# Patient Record
Sex: FEMALE | Race: BLACK OR AFRICAN AMERICAN | NOT HISPANIC OR LATINO | URBAN - METROPOLITAN AREA
[De-identification: names, ages, dates, MRNs, and addresses within clinical notes are randomized per-mention and may not be internally consistent; named-entity substitution may affect disease eponyms.]

---

## 2018-08-14 ENCOUNTER — EMERGENCY (EMERGENCY)
Facility: HOSPITAL | Age: 28
LOS: 1 days | Discharge: HOME | End: 2018-08-14
Attending: EMERGENCY MEDICINE
Payer: COMMERCIAL

## 2018-08-14 VITALS
SYSTOLIC BLOOD PRESSURE: 124 MMHG | HEART RATE: 73 BPM | TEMPERATURE: 99 F | RESPIRATION RATE: 20 BRPM | OXYGEN SATURATION: 97 % | DIASTOLIC BLOOD PRESSURE: 77 MMHG

## 2018-08-14 VITALS — WEIGHT: 111.99 LBS | HEIGHT: 64 IN

## 2018-08-14 LAB
APPEARANCE UR: CLEAR — SIGNIFICANT CHANGE UP
BACTERIA # UR AUTO: ABNORMAL
BILIRUB UR-MCNC: NEGATIVE — SIGNIFICANT CHANGE UP
COLOR SPEC: YELLOW — SIGNIFICANT CHANGE UP
DIFF PNL FLD: ABNORMAL
EPI CELLS # UR: SIGNIFICANT CHANGE UP
GLUCOSE UR QL: NEGATIVE MG/DL — SIGNIFICANT CHANGE UP
HCG UR QL: NEGATIVE — SIGNIFICANT CHANGE UP
KETONES UR-MCNC: ABNORMAL
LEUKOCYTE ESTERASE UR-ACNC: ABNORMAL
NITRITE UR-MCNC: NEGATIVE — SIGNIFICANT CHANGE UP
PH UR: 6 — SIGNIFICANT CHANGE UP (ref 5–8)
PROT UR-MCNC: 30 MG/DL
RBC CASTS # UR COMP ASSIST: ABNORMAL /HPF (ref 0–4)
SP GR SPEC: 1.01 — SIGNIFICANT CHANGE UP (ref 1.01–1.02)
UROBILINOGEN FLD QL: 4 MG/DL
WBC UR QL: SIGNIFICANT CHANGE UP

## 2018-08-14 PROCEDURE — 81001 URINALYSIS AUTO W/SCOPE: CPT

## 2018-08-14 PROCEDURE — 93010 ELECTROCARDIOGRAM REPORT: CPT

## 2018-08-14 PROCEDURE — 71046 X-RAY EXAM CHEST 2 VIEWS: CPT | Mod: 26

## 2018-08-14 PROCEDURE — 99283 EMERGENCY DEPT VISIT LOW MDM: CPT | Mod: 25

## 2018-08-14 PROCEDURE — 99284 EMERGENCY DEPT VISIT MOD MDM: CPT

## 2018-08-14 PROCEDURE — 71046 X-RAY EXAM CHEST 2 VIEWS: CPT

## 2018-08-14 PROCEDURE — 81025 URINE PREGNANCY TEST: CPT

## 2018-08-14 PROCEDURE — 93005 ELECTROCARDIOGRAM TRACING: CPT

## 2018-08-14 PROCEDURE — 96372 THER/PROPH/DIAG INJ SC/IM: CPT

## 2018-08-14 RX ORDER — ACETAMINOPHEN 500 MG
2 TABLET ORAL
Qty: 60 | Refills: 0
Start: 2018-08-14 | End: 2018-08-18

## 2018-08-14 RX ORDER — KETOROLAC TROMETHAMINE 30 MG/ML
60 SYRINGE (ML) INJECTION ONCE
Qty: 0 | Refills: 0 | Status: DISCONTINUED | OUTPATIENT
Start: 2018-08-14 | End: 2018-08-14

## 2018-08-14 RX ORDER — METOCLOPRAMIDE HCL 10 MG
1 TABLET ORAL
Qty: 20 | Refills: 0
Start: 2018-08-14 | End: 2018-08-18

## 2018-08-14 RX ORDER — ACETAMINOPHEN 500 MG
975 TABLET ORAL ONCE
Qty: 0 | Refills: 0 | Status: COMPLETED | OUTPATIENT
Start: 2018-08-14 | End: 2018-08-14

## 2018-08-14 RX ORDER — METOCLOPRAMIDE HCL 10 MG
10 TABLET ORAL ONCE
Qty: 0 | Refills: 0 | Status: COMPLETED | OUTPATIENT
Start: 2018-08-14 | End: 2018-08-14

## 2018-08-14 RX ORDER — IBUPROFEN 200 MG
1 TABLET ORAL
Qty: 20 | Refills: 0
Start: 2018-08-14 | End: 2018-08-18

## 2018-08-14 RX ADMIN — Medication 975 MILLIGRAM(S): at 09:27

## 2018-08-14 RX ADMIN — Medication 60 MILLIGRAM(S): at 10:49

## 2018-08-14 RX ADMIN — Medication 10 MILLIGRAM(S): at 10:49

## 2018-08-14 NOTE — ED STATDOCS - MEDICAL DECISION MAKING DETAILS
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
## 2018-08-14 NOTE — ED STATDOCS - CARE PLAN
Principal Discharge DX:	Headache  Secondary Diagnosis:	Chest wall pain  Secondary Diagnosis:	Bradycardia

## 2018-08-14 NOTE — ED ADULT TRIAGE NOTE - CHIEF COMPLAINT QUOTE
'I am have chest soreness and a really bad headache that started at 6am. I also feel dizzy and like I am going to fall over. '

## 2018-08-14 NOTE — ED STATDOCS - OBJECTIVE STATEMENT
This is a 28 year old with no pmhx and shx 3 medical abortions c/o chest pain, pressure like 8/10, feels sore that began yesterday.  She notes chest pain progressively getting worse.   She also c/o headache 10/10 along frontal aspect, that started 3 hours ago.  She denies taking any pain medication.  She reports nausea and lightheadness.  She denies any cough, runny nose, URI symptoms, fevers, chills, recent travel or rashes.  LMP 8/13.  She follows up with PCP, doesn't know their name.

## 2018-08-14 NOTE — ED STATDOCS - ATTENDING CONTRIBUTION TO CARE
28 year old Male seen and evaluated with ACP  Presents to Ed for chest pain, localized, increase with palpation/mvment  denies fever, chills, sob, leg swelling, calf pain  vitals reviewed, exam as documented  ecg, pain meds, reassess

## 2019-06-05 ENCOUNTER — APPOINTMENT (OUTPATIENT)
Dept: OBGYN | Facility: CLINIC | Age: 29
End: 2019-06-05
Payer: COMMERCIAL

## 2019-06-05 VITALS — HEIGHT: 64 IN | WEIGHT: 121 LBS | BODY MASS INDEX: 20.66 KG/M2

## 2019-06-05 VITALS — DIASTOLIC BLOOD PRESSURE: 70 MMHG | SYSTOLIC BLOOD PRESSURE: 118 MMHG

## 2019-06-05 LAB
BILIRUB UR QL STRIP: NORMAL
CLARITY UR: CLEAR
COLLECTION METHOD: NORMAL
GLUCOSE UR-MCNC: NORMAL
HCG UR QL: 0.2 EU/DL
HCG UR QL: POSITIVE
HGB UR QL STRIP.AUTO: NORMAL
KETONES UR-MCNC: NORMAL
LEUKOCYTE ESTERASE UR QL STRIP: NORMAL
NITRITE UR QL STRIP: NORMAL
PH UR STRIP: 5.5
PROT UR STRIP-MCNC: NORMAL
QUALITY CONTROL: YES
SP GR UR STRIP: 1.02

## 2019-06-05 PROCEDURE — 81025 URINE PREGNANCY TEST: CPT

## 2019-06-05 PROCEDURE — 81003 URINALYSIS AUTO W/O SCOPE: CPT | Mod: QW

## 2019-06-05 PROCEDURE — 99204 OFFICE O/P NEW MOD 45 MIN: CPT

## 2019-06-05 NOTE — HISTORY OF PRESENT ILLNESS
[Last Pap ___] : Last cervical pap smear was [unfilled] [Definite:  ___ (Date)] : the last menstrual period was [unfilled] [Sexually Active] : is sexually active [Male ___] : [unfilled] male

## 2019-06-05 NOTE — COUNSELING
[FreeTextEntry2] : we reviewed dietary and medication restrictions, H20 consumption advised, call parameters reviewed. Pt advised to return to office for new OB in one week, sooner prn

## 2019-06-07 ENCOUNTER — RESULT REVIEW (OUTPATIENT)
Age: 29
End: 2019-06-07

## 2019-06-07 DIAGNOSIS — A54.9 GONOCOCCAL INFECTION, UNSPECIFIED: ICD-10-CM

## 2019-06-08 ENCOUNTER — APPOINTMENT (OUTPATIENT)
Dept: OBGYN | Facility: CLINIC | Age: 29
End: 2019-06-08
Payer: COMMERCIAL

## 2019-06-08 VITALS
HEIGHT: 64 IN | WEIGHT: 121 LBS | BODY MASS INDEX: 20.66 KG/M2 | DIASTOLIC BLOOD PRESSURE: 62 MMHG | SYSTOLIC BLOOD PRESSURE: 112 MMHG

## 2019-06-08 LAB
C TRACH RRNA SPEC QL NAA+PROBE: NORMAL
N GONORRHOEA RRNA SPEC QL NAA+PROBE: ABNORMAL
SOURCE AMPLIFICATION: NORMAL

## 2019-06-08 PROCEDURE — 99213 OFFICE O/P EST LOW 20 MIN: CPT

## 2019-06-08 NOTE — HISTORY OF PRESENT ILLNESS
[Last Screen ___] : last STD screen was [unfilled] [Sexually Active] : is sexually active [Definite:  ___ (Date)] : the last menstrual period was [unfilled]
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
## 2019-06-08 NOTE — PHYSICAL EXAM
[Labia Majora] : labia major [Labia Minora] : labia minora [Normal] : clitoris [Moderate] : moderate [Discharge] : a  ~M vaginal discharge was present [White] : white [Thin] : thin

## 2019-06-10 ENCOUNTER — RESULT REVIEW (OUTPATIENT)
Age: 29
End: 2019-06-10

## 2019-06-10 DIAGNOSIS — A59.9 TRICHOMONIASIS, UNSPECIFIED: ICD-10-CM

## 2019-06-10 LAB
C TRACH RRNA SPEC QL NAA+PROBE: NOT DETECTED
CANDIDA VAG CYTO: NOT DETECTED
G VAGINALIS+PREV SP MTYP VAG QL MICRO: DETECTED
N GONORRHOEA RRNA SPEC QL NAA+PROBE: NOT DETECTED
SOURCE AMPLIFICATION: NORMAL
T VAGINALIS VAG QL WET PREP: DETECTED

## 2019-06-13 ENCOUNTER — APPOINTMENT (OUTPATIENT)
Dept: OBGYN | Facility: CLINIC | Age: 29
End: 2019-06-13
Payer: COMMERCIAL

## 2019-06-13 ENCOUNTER — APPOINTMENT (OUTPATIENT)
Dept: OBGYN | Facility: CLINIC | Age: 29
End: 2019-06-13

## 2019-06-13 ENCOUNTER — ASOB RESULT (OUTPATIENT)
Age: 29
End: 2019-06-13

## 2019-06-13 ENCOUNTER — NON-APPOINTMENT (OUTPATIENT)
Age: 29
End: 2019-06-13

## 2019-06-13 VITALS
BODY MASS INDEX: 20.83 KG/M2 | WEIGHT: 122 LBS | HEIGHT: 64 IN | SYSTOLIC BLOOD PRESSURE: 106 MMHG | DIASTOLIC BLOOD PRESSURE: 70 MMHG

## 2019-06-13 LAB
BILIRUB UR QL STRIP: ABNORMAL
GLUCOSE UR-MCNC: NORMAL
HCG UR QL: 0.2 EU/DL
HGB UR QL STRIP.AUTO: ABNORMAL
KETONES UR-MCNC: ABNORMAL
LEUKOCYTE ESTERASE UR QL STRIP: NORMAL
NITRITE UR QL STRIP: NORMAL
PH UR STRIP: 6
PROT UR STRIP-MCNC: ABNORMAL
SP GR UR STRIP: 1.02

## 2019-06-13 PROCEDURE — 76817 TRANSVAGINAL US OBSTETRIC: CPT

## 2019-06-13 PROCEDURE — 36415 COLL VENOUS BLD VENIPUNCTURE: CPT

## 2019-06-13 PROCEDURE — 99214 OFFICE O/P EST MOD 30 MIN: CPT | Mod: TH

## 2019-06-18 LAB
ABO + RH PNL BLD: NORMAL
BASOPHILS # BLD AUTO: 0.03 K/UL
BASOPHILS NFR BLD AUTO: 0.5 %
BLD GP AB SCN SERPL QL: NORMAL
CMV IGG SERPL QL: 2.8 U/ML
CMV IGG SERPL-IMP: POSITIVE
CMV IGM SERPL QL: <8 AU/ML
CMV IGM SERPL QL: NEGATIVE
EOSINOPHIL # BLD AUTO: 0.04 K/UL
EOSINOPHIL NFR BLD AUTO: 0.6 %
ESTIMATED AVERAGE GLUCOSE: 97 MG/DL
FMR1 GENE MUT ANL BLD/T: NORMAL
HBA1C MFR BLD HPLC: 5 %
HBV SURFACE AG SER QL: NONREACTIVE
HCT VFR BLD CALC: 40.7 %
HGB A MFR BLD: 97.6 %
HGB A2 MFR BLD: 2.4 %
HGB BLD-MCNC: 13.6 G/DL
HGB FRACT BLD-IMP: NORMAL
HIV1+2 AB SPEC QL IA.RAPID: NONREACTIVE
IMM GRANULOCYTES NFR BLD AUTO: 0.3 %
LYMPHOCYTES # BLD AUTO: 1.93 K/UL
LYMPHOCYTES NFR BLD AUTO: 29.6 %
MAN DIFF?: NORMAL
MCHC RBC-ENTMCNC: 29.1 PG
MCHC RBC-ENTMCNC: 33.4 GM/DL
MCV RBC AUTO: 87 FL
MEV IGG FLD QL IA: >300 AU/ML
MEV IGG+IGM SER-IMP: POSITIVE
MONOCYTES # BLD AUTO: 0.48 K/UL
MONOCYTES NFR BLD AUTO: 7.4 %
NEUTROPHILS # BLD AUTO: 4.03 K/UL
NEUTROPHILS NFR BLD AUTO: 61.6 %
PLATELET # BLD AUTO: 259 K/UL
RBC # BLD: 4.68 M/UL
RBC # FLD: 12.8 %
RUBV IGG FLD-ACNC: 1.3 INDEX
RUBV IGG SER-IMP: POSITIVE
T GONDII AB SER-IMP: NEGATIVE
T GONDII AB SER-IMP: NEGATIVE
T GONDII IGG SER QL: <3 IU/ML
T GONDII IGM SER QL: <3 AU/ML
T PALLIDUM AB SER QL IA: NEGATIVE
TSH SERPL-ACNC: 3.95 UIU/ML
WBC # FLD AUTO: 6.53 K/UL

## 2019-06-20 LAB
B19V IGG SER QL IA: 0.2 INDEX
B19V IGG+IGM SER-IMP: NEGATIVE
B19V IGG+IGM SER-IMP: NORMAL
B19V IGM FLD-ACNC: 0.2 INDEX
B19V IGM SER-ACNC: NEGATIVE

## 2019-06-21 ENCOUNTER — APPOINTMENT (OUTPATIENT)
Dept: OBGYN | Facility: CLINIC | Age: 29
End: 2019-06-21

## 2019-06-24 LAB
AR GENE MUT ANL BLD/T: NEGATIVE
CFTR MUT TESTED BLD/T: NEGATIVE

## 2019-07-10 ENCOUNTER — APPOINTMENT (OUTPATIENT)
Dept: OBGYN | Facility: CLINIC | Age: 29
End: 2019-07-10

## 2019-07-11 ENCOUNTER — APPOINTMENT (OUTPATIENT)
Dept: OBGYN | Facility: CLINIC | Age: 29
End: 2019-07-11
Payer: COMMERCIAL

## 2019-07-11 ENCOUNTER — NON-APPOINTMENT (OUTPATIENT)
Age: 29
End: 2019-07-11

## 2019-07-11 ENCOUNTER — ASOB RESULT (OUTPATIENT)
Age: 29
End: 2019-07-11

## 2019-07-11 VITALS — SYSTOLIC BLOOD PRESSURE: 110 MMHG | BODY MASS INDEX: 20.6 KG/M2 | WEIGHT: 120 LBS | DIASTOLIC BLOOD PRESSURE: 68 MMHG

## 2019-07-11 PROCEDURE — 76813 OB US NUCHAL MEAS 1 GEST: CPT

## 2019-07-11 PROCEDURE — 99213 OFFICE O/P EST LOW 20 MIN: CPT | Mod: TH

## 2019-07-11 PROCEDURE — 36415 COLL VENOUS BLD VENIPUNCTURE: CPT

## 2019-07-16 ENCOUNTER — LABORATORY RESULT (OUTPATIENT)
Age: 29
End: 2019-07-16

## 2019-07-17 ENCOUNTER — APPOINTMENT (OUTPATIENT)
Dept: OBGYN | Facility: CLINIC | Age: 29
End: 2019-07-17
Payer: COMMERCIAL

## 2019-07-17 PROCEDURE — 36415 COLL VENOUS BLD VENIPUNCTURE: CPT

## 2019-08-08 ENCOUNTER — APPOINTMENT (OUTPATIENT)
Dept: OBGYN | Facility: CLINIC | Age: 29
End: 2019-08-08
Payer: COMMERCIAL

## 2019-08-08 VITALS
BODY MASS INDEX: 21 KG/M2 | WEIGHT: 123 LBS | DIASTOLIC BLOOD PRESSURE: 60 MMHG | SYSTOLIC BLOOD PRESSURE: 92 MMHG | HEIGHT: 64 IN

## 2019-08-08 LAB
BILIRUB UR QL STRIP: ABNORMAL
CLARITY UR: CLEAR
COLLECTION METHOD: NORMAL
GLUCOSE UR-MCNC: NORMAL
HCG UR QL: 1 EU/DL
HGB UR QL STRIP.AUTO: NORMAL
KETONES UR-MCNC: ABNORMAL
LEUKOCYTE ESTERASE UR QL STRIP: NORMAL
NITRITE UR QL STRIP: NORMAL
PH UR STRIP: 6
PROT UR STRIP-MCNC: NORMAL
SOURCE AMPLIFICATION: NORMAL
SP GR UR STRIP: 1.02
T VAGINALIS RRNA SPEC QL NAA+PROBE: NOT DETECTED

## 2019-08-08 PROCEDURE — 99214 OFFICE O/P EST MOD 30 MIN: CPT | Mod: TH

## 2019-08-08 PROCEDURE — 36415 COLL VENOUS BLD VENIPUNCTURE: CPT

## 2019-08-27 ENCOUNTER — APPOINTMENT (OUTPATIENT)
Dept: ANTEPARTUM | Facility: CLINIC | Age: 29
End: 2019-08-27
Payer: COMMERCIAL

## 2019-08-27 ENCOUNTER — ASOB RESULT (OUTPATIENT)
Age: 29
End: 2019-08-27

## 2019-08-27 PROCEDURE — 76805 OB US >/= 14 WKS SNGL FETUS: CPT

## 2019-08-27 PROCEDURE — 99203 OFFICE O/P NEW LOW 30 MIN: CPT | Mod: 25

## 2019-08-27 PROCEDURE — 76817 TRANSVAGINAL US OBSTETRIC: CPT

## 2019-09-03 ENCOUNTER — APPOINTMENT (OUTPATIENT)
Dept: ANTEPARTUM | Facility: CLINIC | Age: 29
End: 2019-09-03
Payer: COMMERCIAL

## 2019-09-03 ENCOUNTER — ASOB RESULT (OUTPATIENT)
Age: 29
End: 2019-09-03

## 2019-09-03 PROCEDURE — 76817 TRANSVAGINAL US OBSTETRIC: CPT

## 2019-09-03 PROCEDURE — 76815 OB US LIMITED FETUS(S): CPT

## 2019-09-04 ENCOUNTER — NON-APPOINTMENT (OUTPATIENT)
Age: 29
End: 2019-09-04

## 2019-09-04 LAB
1ST TRIMESTER DATA: NORMAL
2ND TRIMESTER DATA: NORMAL
AFP PNL SERPL: NORMAL
AFP SERPL-ACNC: NORMAL
AFP SERPL-ACNC: NORMAL
B-HCG FREE SERPL-MCNC: NORMAL
CLINICAL BIOCHEMIST REVIEW: NORMAL
FREE BETA HCG 1ST TRIMESTER: NORMAL
INHIBIN A SERPL-MCNC: NORMAL
NASAL BONE: PRESENT
NOTES NTD: NORMAL
NT: NORMAL
PAPP-A SERPL-ACNC: NORMAL
U ESTRIOL SERPL-SCNC: NORMAL

## 2019-09-05 ENCOUNTER — NON-APPOINTMENT (OUTPATIENT)
Age: 29
End: 2019-09-05

## 2019-09-05 ENCOUNTER — APPOINTMENT (OUTPATIENT)
Dept: OBGYN | Facility: CLINIC | Age: 29
End: 2019-09-05
Payer: COMMERCIAL

## 2019-09-05 VITALS
WEIGHT: 127 LBS | BODY MASS INDEX: 21.68 KG/M2 | SYSTOLIC BLOOD PRESSURE: 110 MMHG | DIASTOLIC BLOOD PRESSURE: 70 MMHG | HEIGHT: 64 IN

## 2019-09-05 LAB
BILIRUB UR QL STRIP: NORMAL
GLUCOSE UR-MCNC: NORMAL
HCG UR QL: 2 EU/DL
HGB UR QL STRIP.AUTO: NORMAL
KETONES UR-MCNC: ABNORMAL
LEUKOCYTE ESTERASE UR QL STRIP: NORMAL
NITRITE UR QL STRIP: NORMAL
PH UR STRIP: 6
PROT UR STRIP-MCNC: NORMAL
SP GR UR STRIP: 1.02

## 2019-09-05 PROCEDURE — 99213 OFFICE O/P EST LOW 20 MIN: CPT | Mod: TH

## 2019-09-18 ENCOUNTER — APPOINTMENT (OUTPATIENT)
Dept: ANTEPARTUM | Facility: CLINIC | Age: 29
End: 2019-09-18
Payer: COMMERCIAL

## 2019-09-18 ENCOUNTER — ASOB RESULT (OUTPATIENT)
Age: 29
End: 2019-09-18

## 2019-09-18 PROCEDURE — 76815 OB US LIMITED FETUS(S): CPT

## 2019-09-18 PROCEDURE — 76817 TRANSVAGINAL US OBSTETRIC: CPT

## 2019-09-23 ENCOUNTER — APPOINTMENT (OUTPATIENT)
Dept: OBGYN | Facility: CLINIC | Age: 29
End: 2019-09-23
Payer: COMMERCIAL

## 2019-09-23 ENCOUNTER — NON-APPOINTMENT (OUTPATIENT)
Age: 29
End: 2019-09-23

## 2019-09-23 VITALS
WEIGHT: 124.38 LBS | DIASTOLIC BLOOD PRESSURE: 70 MMHG | SYSTOLIC BLOOD PRESSURE: 120 MMHG | BODY MASS INDEX: 21.35 KG/M2

## 2019-09-23 PROCEDURE — 81003 URINALYSIS AUTO W/O SCOPE: CPT | Mod: QW

## 2019-09-23 PROCEDURE — 99214 OFFICE O/P EST MOD 30 MIN: CPT | Mod: TH

## 2019-09-24 LAB
APPEARANCE: CLEAR
BACTERIA: ABNORMAL
BILIRUBIN URINE: NEGATIVE
BLOOD URINE: NEGATIVE
COLOR: YELLOW
GLUCOSE QUALITATIVE U: NEGATIVE
HYALINE CASTS: 2 /LPF
KETONES URINE: NEGATIVE
LEUKOCYTE ESTERASE URINE: NEGATIVE
MICROSCOPIC-UA: NORMAL
NITRITE URINE: NEGATIVE
PH URINE: 6.5
PROTEIN URINE: NORMAL
RED BLOOD CELLS URINE: 1 /HPF
SPECIFIC GRAVITY URINE: 1.04
SQUAMOUS EPITHELIAL CELLS: 12 /HPF
UROBILINOGEN URINE: ABNORMAL
WHITE BLOOD CELLS URINE: 2 /HPF

## 2019-09-25 ENCOUNTER — ASOB RESULT (OUTPATIENT)
Age: 29
End: 2019-09-25

## 2019-09-25 ENCOUNTER — APPOINTMENT (OUTPATIENT)
Dept: ANTEPARTUM | Facility: CLINIC | Age: 29
End: 2019-09-25
Payer: COMMERCIAL

## 2019-09-25 ENCOUNTER — APPOINTMENT (OUTPATIENT)
Dept: MATERNAL FETAL MEDICINE | Facility: CLINIC | Age: 29
End: 2019-09-25
Payer: COMMERCIAL

## 2019-09-25 PROCEDURE — 76817 TRANSVAGINAL US OBSTETRIC: CPT

## 2019-09-25 PROCEDURE — 76815 OB US LIMITED FETUS(S): CPT | Mod: 59

## 2019-09-25 PROCEDURE — 93325 DOPPLER ECHO COLOR FLOW MAPG: CPT

## 2019-09-25 PROCEDURE — 76825 ECHO EXAM OF FETAL HEART: CPT

## 2019-09-25 PROCEDURE — 99214 OFFICE O/P EST MOD 30 MIN: CPT

## 2019-10-01 LAB
BILIRUB UR QL STRIP: ABNORMAL
GLUCOSE UR-MCNC: NORMAL
HCG UR QL: 1 EU/DL
HGB UR QL STRIP.AUTO: NORMAL
KETONES UR-MCNC: NORMAL
LEUKOCYTE ESTERASE UR QL STRIP: NORMAL
NITRITE UR QL STRIP: NORMAL
PH UR STRIP: 6
PROT UR STRIP-MCNC: NORMAL
SP GR UR STRIP: 1.02

## 2019-10-02 ENCOUNTER — ASOB RESULT (OUTPATIENT)
Age: 29
End: 2019-10-02

## 2019-10-02 ENCOUNTER — APPOINTMENT (OUTPATIENT)
Dept: ANTEPARTUM | Facility: CLINIC | Age: 29
End: 2019-10-02
Payer: COMMERCIAL

## 2019-10-02 PROCEDURE — 76815 OB US LIMITED FETUS(S): CPT

## 2019-10-02 PROCEDURE — 76817 TRANSVAGINAL US OBSTETRIC: CPT

## 2019-10-07 LAB
BACTERIA UR CULT: NORMAL
CANDIDA VAG CYTO: NOT DETECTED
G VAGINALIS+PREV SP MTYP VAG QL MICRO: DETECTED
T VAGINALIS VAG QL WET PREP: NOT DETECTED

## 2019-10-09 ENCOUNTER — ASOB RESULT (OUTPATIENT)
Age: 29
End: 2019-10-09

## 2019-10-09 ENCOUNTER — APPOINTMENT (OUTPATIENT)
Dept: ANTEPARTUM | Facility: CLINIC | Age: 29
End: 2019-10-09
Payer: COMMERCIAL

## 2019-10-09 PROCEDURE — 76817 TRANSVAGINAL US OBSTETRIC: CPT

## 2019-10-14 ENCOUNTER — MED ADMIN CHARGE (OUTPATIENT)
Age: 29
End: 2019-10-14

## 2019-10-14 ENCOUNTER — APPOINTMENT (OUTPATIENT)
Dept: OBGYN | Facility: CLINIC | Age: 29
End: 2019-10-14
Payer: COMMERCIAL

## 2019-10-14 ENCOUNTER — NON-APPOINTMENT (OUTPATIENT)
Age: 29
End: 2019-10-14

## 2019-10-14 VITALS
DIASTOLIC BLOOD PRESSURE: 68 MMHG | SYSTOLIC BLOOD PRESSURE: 120 MMHG | WEIGHT: 131 LBS | HEIGHT: 64 IN | BODY MASS INDEX: 22.36 KG/M2

## 2019-10-14 DIAGNOSIS — Z67.91 UNSPECIFIED BLOOD TYPE, RH NEGATIVE: ICD-10-CM

## 2019-10-14 LAB
BILIRUB UR QL STRIP: NORMAL
CLARITY UR: CLEAR
COLLECTION METHOD: NORMAL
GLUCOSE UR-MCNC: NORMAL
HCG UR QL: 1 EU/DL
HGB UR QL STRIP.AUTO: NORMAL
KETONES UR-MCNC: NORMAL
LEUKOCYTE ESTERASE UR QL STRIP: NORMAL
NITRITE UR QL STRIP: NORMAL
PH UR STRIP: 6
PROT UR STRIP-MCNC: NORMAL
SP GR UR STRIP: 1.02

## 2019-10-14 PROCEDURE — 99213 OFFICE O/P EST LOW 20 MIN: CPT | Mod: TH

## 2019-10-14 RX ORDER — HUMAN RHO(D) IMMUNE GLOBULIN 300 UG/1
1500 INJECTION, SOLUTION INTRAMUSCULAR
Refills: 0 | Status: COMPLETED | OUTPATIENT
Start: 2019-10-14

## 2019-10-15 LAB
BASOPHILS # BLD AUTO: 0.03 K/UL
BASOPHILS NFR BLD AUTO: 0.4 %
EOSINOPHIL # BLD AUTO: 0.12 K/UL
EOSINOPHIL NFR BLD AUTO: 1.6 %
GLUCOSE 1H P 50 G GLC PO SERPL-MCNC: 89 MG/DL
HCT VFR BLD CALC: 34.6 %
HGB BLD-MCNC: 11.4 G/DL
IMM GRANULOCYTES NFR BLD AUTO: 0.5 %
LYMPHOCYTES # BLD AUTO: 1.93 K/UL
LYMPHOCYTES NFR BLD AUTO: 25.6 %
MAN DIFF?: NORMAL
MCHC RBC-ENTMCNC: 28.7 PG
MCHC RBC-ENTMCNC: 32.9 GM/DL
MCV RBC AUTO: 87.2 FL
MONOCYTES # BLD AUTO: 0.55 K/UL
MONOCYTES NFR BLD AUTO: 7.3 %
NEUTROPHILS # BLD AUTO: 4.87 K/UL
NEUTROPHILS NFR BLD AUTO: 64.6 %
PLATELET # BLD AUTO: 216 K/UL
RBC # BLD: 3.97 M/UL
RBC # FLD: 12.7 %
WBC # FLD AUTO: 7.54 K/UL

## 2019-10-16 ENCOUNTER — APPOINTMENT (OUTPATIENT)
Dept: ANTEPARTUM | Facility: CLINIC | Age: 29
End: 2019-10-16
Payer: COMMERCIAL

## 2019-10-16 ENCOUNTER — ASOB RESULT (OUTPATIENT)
Age: 29
End: 2019-10-16

## 2019-10-16 PROCEDURE — 76817 TRANSVAGINAL US OBSTETRIC: CPT

## 2019-10-28 ENCOUNTER — APPOINTMENT (OUTPATIENT)
Dept: OBGYN | Facility: CLINIC | Age: 29
End: 2019-10-28
Payer: COMMERCIAL

## 2019-10-28 ENCOUNTER — NON-APPOINTMENT (OUTPATIENT)
Age: 29
End: 2019-10-28

## 2019-10-28 VITALS
HEIGHT: 64 IN | DIASTOLIC BLOOD PRESSURE: 66 MMHG | WEIGHT: 134 LBS | BODY MASS INDEX: 22.88 KG/M2 | SYSTOLIC BLOOD PRESSURE: 114 MMHG

## 2019-10-28 PROCEDURE — 99213 OFFICE O/P EST LOW 20 MIN: CPT | Mod: TH

## 2019-11-06 ENCOUNTER — APPOINTMENT (OUTPATIENT)
Dept: ANTEPARTUM | Facility: CLINIC | Age: 29
End: 2019-11-06
Payer: COMMERCIAL

## 2019-11-06 ENCOUNTER — ASOB RESULT (OUTPATIENT)
Age: 29
End: 2019-11-06

## 2019-11-06 PROCEDURE — 76817 TRANSVAGINAL US OBSTETRIC: CPT

## 2019-11-12 ENCOUNTER — APPOINTMENT (OUTPATIENT)
Dept: OBGYN | Facility: CLINIC | Age: 29
End: 2019-11-12

## 2019-11-12 VITALS
DIASTOLIC BLOOD PRESSURE: 68 MMHG | WEIGHT: 137 LBS | HEIGHT: 64 IN | BODY MASS INDEX: 23.39 KG/M2 | SYSTOLIC BLOOD PRESSURE: 110 MMHG

## 2019-11-12 LAB
BILIRUB UR QL STRIP: NORMAL
COLLECTION METHOD: NORMAL
GLUCOSE UR-MCNC: NORMAL
HCG UR QL: 0.2 EU/DL
HGB UR QL STRIP.AUTO: NORMAL
KETONES UR-MCNC: ABNORMAL
LEUKOCYTE ESTERASE UR QL STRIP: NORMAL
NITRITE UR QL STRIP: NORMAL
PH UR STRIP: 5.5
PROT UR STRIP-MCNC: NORMAL
SP GR UR STRIP: 1.02

## 2019-11-26 ENCOUNTER — APPOINTMENT (OUTPATIENT)
Dept: OBGYN | Facility: CLINIC | Age: 29
End: 2019-11-26
Payer: COMMERCIAL

## 2019-11-26 ENCOUNTER — NON-APPOINTMENT (OUTPATIENT)
Age: 29
End: 2019-11-26

## 2019-11-26 VITALS — WEIGHT: 138 LBS | DIASTOLIC BLOOD PRESSURE: 65 MMHG | SYSTOLIC BLOOD PRESSURE: 109 MMHG | BODY MASS INDEX: 23.69 KG/M2

## 2019-11-26 LAB
BACTERIA UR CULT: NORMAL
BILIRUB UR QL STRIP: NORMAL
GLUCOSE UR-MCNC: NORMAL
HCG UR QL: 4 EU/DL
HGB UR QL STRIP.AUTO: NORMAL
KETONES UR-MCNC: ABNORMAL
LEUKOCYTE ESTERASE UR QL STRIP: ABNORMAL
NITRITE UR QL STRIP: NORMAL
PH UR STRIP: 7
PROT UR STRIP-MCNC: ABNORMAL
SP GR UR STRIP: 1.02

## 2019-11-26 PROCEDURE — 99213 OFFICE O/P EST LOW 20 MIN: CPT | Mod: TH

## 2019-12-04 ENCOUNTER — ASOB RESULT (OUTPATIENT)
Age: 29
End: 2019-12-04

## 2019-12-04 ENCOUNTER — APPOINTMENT (OUTPATIENT)
Dept: ANTEPARTUM | Facility: CLINIC | Age: 29
End: 2019-12-04
Payer: COMMERCIAL

## 2019-12-04 PROCEDURE — 76816 OB US FOLLOW-UP PER FETUS: CPT

## 2019-12-04 PROCEDURE — 76819 FETAL BIOPHYS PROFIL W/O NST: CPT

## 2019-12-13 ENCOUNTER — APPOINTMENT (OUTPATIENT)
Dept: OBGYN | Facility: CLINIC | Age: 29
End: 2019-12-13
Payer: COMMERCIAL

## 2019-12-13 ENCOUNTER — NON-APPOINTMENT (OUTPATIENT)
Age: 29
End: 2019-12-13

## 2019-12-13 VITALS
WEIGHT: 142 LBS | HEIGHT: 64 IN | BODY MASS INDEX: 24.24 KG/M2 | SYSTOLIC BLOOD PRESSURE: 112 MMHG | DIASTOLIC BLOOD PRESSURE: 64 MMHG

## 2019-12-13 PROCEDURE — 36415 COLL VENOUS BLD VENIPUNCTURE: CPT

## 2019-12-13 PROCEDURE — 99213 OFFICE O/P EST LOW 20 MIN: CPT | Mod: TH

## 2019-12-20 ENCOUNTER — APPOINTMENT (OUTPATIENT)
Dept: OBGYN | Facility: CLINIC | Age: 29
End: 2019-12-20

## 2019-12-20 VITALS
BODY MASS INDEX: 24.92 KG/M2 | WEIGHT: 146 LBS | SYSTOLIC BLOOD PRESSURE: 112 MMHG | DIASTOLIC BLOOD PRESSURE: 64 MMHG | HEIGHT: 64 IN

## 2019-12-20 LAB
BILIRUB UR QL STRIP: NORMAL
COLLECTION METHOD: NORMAL
GLUCOSE UR-MCNC: NORMAL
HCG UR QL: 1 EU/DL
HGB UR QL STRIP.AUTO: NORMAL
KETONES UR-MCNC: NORMAL
LEUKOCYTE ESTERASE UR QL STRIP: NORMAL
NITRITE UR QL STRIP: NORMAL
PH UR STRIP: 7
PROT UR STRIP-MCNC: ABNORMAL
SP GR UR STRIP: 1.02

## 2019-12-27 ENCOUNTER — NON-APPOINTMENT (OUTPATIENT)
Age: 29
End: 2019-12-27

## 2019-12-27 ENCOUNTER — APPOINTMENT (OUTPATIENT)
Dept: OBGYN | Facility: CLINIC | Age: 29
End: 2019-12-27
Payer: COMMERCIAL

## 2019-12-27 VITALS
DIASTOLIC BLOOD PRESSURE: 60 MMHG | BODY MASS INDEX: 25.27 KG/M2 | HEIGHT: 64 IN | SYSTOLIC BLOOD PRESSURE: 108 MMHG | WEIGHT: 148 LBS

## 2019-12-27 PROCEDURE — 99213 OFFICE O/P EST LOW 20 MIN: CPT | Mod: TH

## 2020-01-03 ENCOUNTER — APPOINTMENT (OUTPATIENT)
Dept: OBGYN | Facility: CLINIC | Age: 30
End: 2020-01-03

## 2020-01-03 ENCOUNTER — ASOB RESULT (OUTPATIENT)
Age: 30
End: 2020-01-03

## 2020-01-03 ENCOUNTER — RESULT REVIEW (OUTPATIENT)
Age: 30
End: 2020-01-03

## 2020-01-03 ENCOUNTER — INPATIENT (INPATIENT)
Facility: HOSPITAL | Age: 30
LOS: 1 days | Discharge: HOME | DRG: 833 | End: 2020-01-05
Attending: OBSTETRICS & GYNECOLOGY | Admitting: OBSTETRICS & GYNECOLOGY
Payer: COMMERCIAL

## 2020-01-03 ENCOUNTER — APPOINTMENT (OUTPATIENT)
Dept: ANTEPARTUM | Facility: CLINIC | Age: 30
End: 2020-01-03
Payer: COMMERCIAL

## 2020-01-03 VITALS
HEART RATE: 83 BPM | WEIGHT: 145.95 LBS | TEMPERATURE: 99 F | HEIGHT: 64 IN | SYSTOLIC BLOOD PRESSURE: 123 MMHG | DIASTOLIC BLOOD PRESSURE: 80 MMHG | RESPIRATION RATE: 14 BRPM

## 2020-01-03 DIAGNOSIS — O26.893 OTHER SPECIFIED PREGNANCY RELATED CONDITIONS, THIRD TRIMESTER: ICD-10-CM

## 2020-01-03 DIAGNOSIS — Z98.890 OTHER SPECIFIED POSTPROCEDURAL STATES: Chronic | ICD-10-CM

## 2020-01-03 DIAGNOSIS — O47.1 FALSE LABOR AT OR AFTER 37 COMPLETED WEEKS OF GESTATION: ICD-10-CM

## 2020-01-03 LAB
ALBUMIN SERPL ELPH-MCNC: 3.4 G/DL — SIGNIFICANT CHANGE UP (ref 3.3–5.2)
ALP SERPL-CCNC: 176 U/L — HIGH (ref 40–120)
ALT FLD-CCNC: 8 U/L — SIGNIFICANT CHANGE UP
ANION GAP SERPL CALC-SCNC: 14 MMOL/L — SIGNIFICANT CHANGE UP (ref 5–17)
APPEARANCE UR: CLEAR — SIGNIFICANT CHANGE UP
APTT BLD: 26.6 SEC — LOW (ref 27.5–36.3)
AST SERPL-CCNC: 15 U/L — SIGNIFICANT CHANGE UP
BACTERIA # UR AUTO: ABNORMAL
BASOPHILS # BLD AUTO: 0.02 K/UL — SIGNIFICANT CHANGE UP (ref 0–0.2)
BASOPHILS # BLD AUTO: 0.03 K/UL — SIGNIFICANT CHANGE UP (ref 0–0.2)
BASOPHILS NFR BLD AUTO: 0.2 % — SIGNIFICANT CHANGE UP (ref 0–2)
BASOPHILS NFR BLD AUTO: 0.4 % — SIGNIFICANT CHANGE UP (ref 0–2)
BILIRUB SERPL-MCNC: <0.2 MG/DL — LOW (ref 0.4–2)
BILIRUB UR-MCNC: NEGATIVE — SIGNIFICANT CHANGE UP
BLD GP AB SCN SERPL QL: SIGNIFICANT CHANGE UP
BUN SERPL-MCNC: 6 MG/DL — LOW (ref 8–20)
CALCIUM SERPL-MCNC: 9 MG/DL — SIGNIFICANT CHANGE UP (ref 8.6–10.2)
CHLORIDE SERPL-SCNC: 107 MMOL/L — SIGNIFICANT CHANGE UP (ref 98–107)
CO2 SERPL-SCNC: 19 MMOL/L — LOW (ref 22–29)
COLOR SPEC: YELLOW — SIGNIFICANT CHANGE UP
COMMENT - URINE: SIGNIFICANT CHANGE UP
CREAT SERPL-MCNC: 0.39 MG/DL — LOW (ref 0.5–1.3)
DIFF PNL FLD: NEGATIVE — SIGNIFICANT CHANGE UP
EOSINOPHIL # BLD AUTO: 0.02 K/UL — SIGNIFICANT CHANGE UP (ref 0–0.5)
EOSINOPHIL # BLD AUTO: 0.02 K/UL — SIGNIFICANT CHANGE UP (ref 0–0.5)
EOSINOPHIL NFR BLD AUTO: 0.2 % — SIGNIFICANT CHANGE UP (ref 0–6)
EOSINOPHIL NFR BLD AUTO: 0.2 % — SIGNIFICANT CHANGE UP (ref 0–6)
EPI CELLS # UR: SIGNIFICANT CHANGE UP
FIBRINOGEN PPP-MCNC: 463 MG/DL — SIGNIFICANT CHANGE UP (ref 350–510)
GLUCOSE SERPL-MCNC: 79 MG/DL — SIGNIFICANT CHANGE UP (ref 70–115)
GLUCOSE UR QL: NEGATIVE MG/DL — SIGNIFICANT CHANGE UP
HCT VFR BLD CALC: 34.7 % — SIGNIFICANT CHANGE UP (ref 34.5–45)
HCT VFR BLD CALC: 35.8 % — SIGNIFICANT CHANGE UP (ref 34.5–45)
HGB BLD-MCNC: 11.3 G/DL — LOW (ref 11.5–15.5)
HGB BLD-MCNC: 11.5 G/DL — SIGNIFICANT CHANGE UP (ref 11.5–15.5)
IMM GRANULOCYTES NFR BLD AUTO: 0.5 % — SIGNIFICANT CHANGE UP (ref 0–1.5)
IMM GRANULOCYTES NFR BLD AUTO: 0.7 % — SIGNIFICANT CHANGE UP (ref 0–1.5)
INR BLD: 0.86 RATIO — LOW (ref 0.88–1.16)
KETONES UR-MCNC: ABNORMAL
LDH SERPL L TO P-CCNC: 233 U/L — HIGH (ref 98–192)
LEUKOCYTE ESTERASE UR-ACNC: ABNORMAL
LYMPHOCYTES # BLD AUTO: 1.47 K/UL — SIGNIFICANT CHANGE UP (ref 1–3.3)
LYMPHOCYTES # BLD AUTO: 1.5 K/UL — SIGNIFICANT CHANGE UP (ref 1–3.3)
LYMPHOCYTES # BLD AUTO: 12.2 % — LOW (ref 13–44)
LYMPHOCYTES # BLD AUTO: 18.7 % — SIGNIFICANT CHANGE UP (ref 13–44)
MCHC RBC-ENTMCNC: 26.3 PG — LOW (ref 27–34)
MCHC RBC-ENTMCNC: 26.9 PG — LOW (ref 27–34)
MCHC RBC-ENTMCNC: 32.1 GM/DL — SIGNIFICANT CHANGE UP (ref 32–36)
MCHC RBC-ENTMCNC: 32.6 GM/DL — SIGNIFICANT CHANGE UP (ref 32–36)
MCV RBC AUTO: 81.7 FL — SIGNIFICANT CHANGE UP (ref 80–100)
MCV RBC AUTO: 82.6 FL — SIGNIFICANT CHANGE UP (ref 80–100)
MONOCYTES # BLD AUTO: 0.47 K/UL — SIGNIFICANT CHANGE UP (ref 0–0.9)
MONOCYTES # BLD AUTO: 0.47 K/UL — SIGNIFICANT CHANGE UP (ref 0–0.9)
MONOCYTES NFR BLD AUTO: 3.9 % — SIGNIFICANT CHANGE UP (ref 2–14)
MONOCYTES NFR BLD AUTO: 5.8 % — SIGNIFICANT CHANGE UP (ref 2–14)
NEUTROPHILS # BLD AUTO: 5.98 K/UL — SIGNIFICANT CHANGE UP (ref 1.8–7.4)
NEUTROPHILS # BLD AUTO: 9.99 K/UL — HIGH (ref 1.8–7.4)
NEUTROPHILS NFR BLD AUTO: 74.4 % — SIGNIFICANT CHANGE UP (ref 43–77)
NEUTROPHILS NFR BLD AUTO: 82.8 % — HIGH (ref 43–77)
NITRITE UR-MCNC: NEGATIVE — SIGNIFICANT CHANGE UP
PH UR: 6 — SIGNIFICANT CHANGE UP (ref 5–8)
PLATELET # BLD AUTO: 235 K/UL — SIGNIFICANT CHANGE UP (ref 150–400)
PLATELET # BLD AUTO: 259 K/UL — SIGNIFICANT CHANGE UP (ref 150–400)
POTASSIUM SERPL-MCNC: 3.5 MMOL/L — SIGNIFICANT CHANGE UP (ref 3.5–5.3)
POTASSIUM SERPL-SCNC: 3.5 MMOL/L — SIGNIFICANT CHANGE UP (ref 3.5–5.3)
PROT SERPL-MCNC: 6.8 G/DL — SIGNIFICANT CHANGE UP (ref 6.6–8.7)
PROT UR-MCNC: 15 MG/DL
PROTHROM AB SERPL-ACNC: 9.9 SEC — LOW (ref 10–12.9)
RBC # BLD: 4.2 M/UL — SIGNIFICANT CHANGE UP (ref 3.8–5.2)
RBC # BLD: 4.38 M/UL — SIGNIFICANT CHANGE UP (ref 3.8–5.2)
RBC # FLD: 12.6 % — SIGNIFICANT CHANGE UP (ref 10.3–14.5)
RBC # FLD: 12.8 % — SIGNIFICANT CHANGE UP (ref 10.3–14.5)
RBC CASTS # UR COMP ASSIST: NEGATIVE /HPF — SIGNIFICANT CHANGE UP (ref 0–4)
SODIUM SERPL-SCNC: 140 MMOL/L — SIGNIFICANT CHANGE UP (ref 135–145)
SP GR SPEC: 1.02 — SIGNIFICANT CHANGE UP (ref 1.01–1.02)
URATE SERPL-MCNC: 4.6 MG/DL — SIGNIFICANT CHANGE UP (ref 2.4–5.7)
UROBILINOGEN FLD QL: 1 MG/DL
WBC # BLD: 12.05 K/UL — HIGH (ref 3.8–10.5)
WBC # BLD: 8.04 K/UL — SIGNIFICANT CHANGE UP (ref 3.8–10.5)
WBC # FLD AUTO: 12.05 K/UL — HIGH (ref 3.8–10.5)
WBC # FLD AUTO: 8.04 K/UL — SIGNIFICANT CHANGE UP (ref 3.8–10.5)
WBC UR QL: ABNORMAL

## 2020-01-03 PROCEDURE — 76820 UMBILICAL ARTERY ECHO: CPT

## 2020-01-03 PROCEDURE — 88307 TISSUE EXAM BY PATHOLOGIST: CPT | Mod: 26

## 2020-01-03 PROCEDURE — 59409 OBSTETRICAL CARE: CPT | Mod: U9

## 2020-01-03 PROCEDURE — 76819 FETAL BIOPHYS PROFIL W/O NST: CPT

## 2020-01-03 PROCEDURE — 76816 OB US FOLLOW-UP PER FETUS: CPT

## 2020-01-03 RX ORDER — OXYTOCIN 10 UNIT/ML
333.33 VIAL (ML) INJECTION
Qty: 20 | Refills: 0 | Status: COMPLETED | OUTPATIENT
Start: 2020-01-03 | End: 2020-01-03

## 2020-01-03 RX ORDER — MAGNESIUM SULFATE 500 MG/ML
2 VIAL (ML) INJECTION
Qty: 40 | Refills: 0 | Status: DISCONTINUED | OUTPATIENT
Start: 2020-01-03 | End: 2020-01-04

## 2020-01-03 RX ORDER — HYDROCORTISONE 1 %
1 OINTMENT (GRAM) TOPICAL EVERY 6 HOURS
Refills: 0 | Status: DISCONTINUED | OUTPATIENT
Start: 2020-01-04 | End: 2020-01-05

## 2020-01-03 RX ORDER — SODIUM CHLORIDE 9 MG/ML
1000 INJECTION, SOLUTION INTRAVENOUS
Refills: 0 | Status: DISCONTINUED | OUTPATIENT
Start: 2020-01-03 | End: 2020-01-05

## 2020-01-03 RX ORDER — SODIUM CHLORIDE 9 MG/ML
1000 INJECTION, SOLUTION INTRAVENOUS
Refills: 0 | Status: DISCONTINUED | OUTPATIENT
Start: 2020-01-03 | End: 2020-01-03

## 2020-01-03 RX ORDER — MAGNESIUM HYDROXIDE 400 MG/1
30 TABLET, CHEWABLE ORAL
Refills: 0 | Status: DISCONTINUED | OUTPATIENT
Start: 2020-01-04 | End: 2020-01-05

## 2020-01-03 RX ORDER — LANOLIN
1 OINTMENT (GRAM) TOPICAL EVERY 6 HOURS
Refills: 0 | Status: DISCONTINUED | OUTPATIENT
Start: 2020-01-04 | End: 2020-01-05

## 2020-01-03 RX ORDER — CITRIC ACID/SODIUM CITRATE 300-500 MG
30 SOLUTION, ORAL ORAL ONCE
Refills: 0 | Status: COMPLETED | OUTPATIENT
Start: 2020-01-03 | End: 2020-01-03

## 2020-01-03 RX ORDER — MAGNESIUM SULFATE 500 MG/ML
4 VIAL (ML) INJECTION ONCE
Refills: 0 | Status: COMPLETED | OUTPATIENT
Start: 2020-01-03 | End: 2020-01-03

## 2020-01-03 RX ORDER — OXYTOCIN 10 UNIT/ML
2 VIAL (ML) INJECTION
Qty: 30 | Refills: 0 | Status: DISCONTINUED | OUTPATIENT
Start: 2020-01-03 | End: 2020-01-05

## 2020-01-03 RX ORDER — SIMETHICONE 80 MG/1
80 TABLET, CHEWABLE ORAL EVERY 4 HOURS
Refills: 0 | Status: DISCONTINUED | OUTPATIENT
Start: 2020-01-04 | End: 2020-01-05

## 2020-01-03 RX ORDER — IBUPROFEN 200 MG
600 TABLET ORAL EVERY 6 HOURS
Refills: 0 | Status: COMPLETED | OUTPATIENT
Start: 2020-01-04 | End: 2020-12-02

## 2020-01-03 RX ORDER — OXYCODONE HYDROCHLORIDE 5 MG/1
5 TABLET ORAL
Refills: 0 | Status: DISCONTINUED | OUTPATIENT
Start: 2020-01-04 | End: 2020-01-05

## 2020-01-03 RX ORDER — CARBOPROST TROMETHAMINE 250 UG/ML
250 INJECTION, SOLUTION INTRAMUSCULAR ONCE
Refills: 0 | Status: COMPLETED | OUTPATIENT
Start: 2020-01-03 | End: 2020-01-03

## 2020-01-03 RX ORDER — AER TRAVELER 0.5 G/1
1 SOLUTION RECTAL; TOPICAL EVERY 4 HOURS
Refills: 0 | Status: DISCONTINUED | OUTPATIENT
Start: 2020-01-04 | End: 2020-01-05

## 2020-01-03 RX ORDER — GLYCERIN ADULT
1 SUPPOSITORY, RECTAL RECTAL AT BEDTIME
Refills: 0 | Status: DISCONTINUED | OUTPATIENT
Start: 2020-01-04 | End: 2020-01-05

## 2020-01-03 RX ADMIN — CARBOPROST TROMETHAMINE 250 MICROGRAM(S): 250 INJECTION, SOLUTION INTRAMUSCULAR at 19:21

## 2020-01-03 RX ADMIN — Medication 200 GRAM(S): at 20:45

## 2020-01-03 RX ADMIN — Medication 50 GM/HR: at 21:06

## 2020-01-03 RX ADMIN — SODIUM CHLORIDE 125 MILLILITER(S): 9 INJECTION, SOLUTION INTRAVENOUS at 12:55

## 2020-01-03 RX ADMIN — Medication 1000 MILLIUNIT(S)/MIN: at 19:06

## 2020-01-03 RX ADMIN — Medication 2 MILLIUNIT(S)/MIN: at 13:45

## 2020-01-03 RX ADMIN — SODIUM CHLORIDE 125 MILLILITER(S): 9 INJECTION, SOLUTION INTRAVENOUS at 17:06

## 2020-01-03 RX ADMIN — Medication 30 MILLILITER(S): at 18:50

## 2020-01-03 RX ADMIN — Medication 0.2 MILLIGRAM(S): at 19:19

## 2020-01-03 NOTE — OB RN DELIVERY SUMMARY - NS_SEPSISRSKCALC_OBGYN_ALL_OB_FT
EOS calculated successfully. EOS Risk Factor: 0.5/1000 live births ([facility] national incidence); GA=39w1d; Temp=98.6; ROM=0; GBS='Negative'; Antibiotics='No antibiotics or any antibiotics < 2 hrs prior to birth'

## 2020-01-03 NOTE — CHART NOTE - NSCHARTNOTEFT_GEN_A_CORE
Patient had elevated severe range BPs after delivery meeting HTN protocol  Patient denies headache, vision changes or RUQ pain  She is a little nauseous, but no pain is minimal overall  Will obtain pre-eclamptic labs  Decision made not to push IV anti hypertensive meds at this time  Will cycle BPs every 5 mins    D/W Dr. Tineo

## 2020-01-03 NOTE — CHART NOTE - NSCHARTNOTEFT_GEN_A_CORE
Patient with continued labile blood pressures ranging from 134/64 - 175/71.    Vital Signs Last 24 Hrs  T(C): 36.8 (03 Jan 2020 19:34), Max: 37 (03 Jan 2020 12:23)  T(F): 98.2 (03 Jan 2020 19:34), Max: 98.6 (03 Jan 2020 12:23)  HR: 57 (03 Jan 2020 20:45) (57 - 93)  BP: 152/81 (03 Jan 2020 20:45) (107/66 - 175/71)  RR: 15 (03 Jan 2020 20:24) (13 - 17)  SpO2: 100% (03 Jan 2020 19:03) (97% - 100%)    Decision was made not to give IV pushes.   Magnesium started for seizure ppx.    Osiel Tineo

## 2020-01-03 NOTE — OB PROVIDER H&P - HISTORY OF PRESENT ILLNESS
JORGE MILES is a 29y  at 39w1d (c/w LMP) who presented to L&D after being sent from [facility] for an induction IUGR. Patient had gonnorrhea and trich with this pregnancy.  rh negative received rhogam at 28wk,   LMP: 2019  CORRIE: 2020  BMI: 20.9  obhx:   G1: 2013 at 40w [id number]   - history of PPH with a transfusion  SAB x 3 with D&C  pmhx: history of gonorrhea, and trichomonas,   pshx: none  allergies: nkda JORGE MILES is a 30 y/o  at 39w1d (c/w LMP) who presented to L&D after being sent from [facility]'s office for an induction of labor due to IUGR. OB sono today: Vtx, EFW 2773 g (7). Normal umbilical artery dopplers. Elevated right uterine artery doppler with absent notching.    She is doing well with no complaints. Denies any abdominal pain, vaginal bleeding or leakage of fluid. She endorses good fetal movement.    Prenatal course was complicated by:  1) h/o short cervix (previously on vaginal progesterone)  2) History of trichomoniasis during pregnancy s/p treatment and negative LINDA  3) Rh negative- s/p Rhogam at 27 weeks  4) Echogenic intracardiac focus  5) History of postpartum hemorrhage with prior delivery    LMP: 2019  CORRIE: 2020    BMI: 20.9    pmhx: Denies  obhx:   G1: 2013 at 40w [id number]   - history of PPH with a blood transfusion  SAB x 3 with D&C  Gyn Hx: h/o gonorrhea and trichomoniasis  pshx: D&C  allergies: nkda

## 2020-01-03 NOTE — OB PROVIDER H&P - ASSESSMENT
JORGE MILES is a 29y  at 39w1d who presented for an induction of labor for IUGR in the 7%.     Plan  - pitocin induction  - CBC, type and screen, ABO confirmation, urinalysis, RPR  - consent    d/w Dr. Tineo JORGE MILES is a 28 y/o  at 39w1d,  who presents for an induction of labor for IUGR in the 7%.     FHT: Category 1  [place]: Irregular ctx    Plan:  - Admit to L&D  - Admission labs  - Induction with Pitocin  - Consent obtained  - GBS negative  - CEFM +[place]  - Will continue to monitor.    d/w Dr. Tineo

## 2020-01-03 NOTE — OB PROVIDER H&P - NSHPPHYSICALEXAM_GEN_ALL_CORE
Vital Signs Last 24 Hrs  T(C): 37 (03 Jan 2020 12:23), Max: 37 (03 Jan 2020 12:23)  T(F): 98.6 (03 Jan 2020 12:23), Max: 98.6 (03 Jan 2020 12:23)  HR: 83 (03 Jan 2020 12:25) (83 - 83)  BP: 123/80 (03 Jan 2020 12:25) (123/80 - 123/80)  RR: 14 (03 Jan 2020 12:23) (14 - 14)  General: Alert and oriented x3, no acute distress  Cardiovascular: regular rate and rhythm, no murmurs, rubs or gallops appreciated on exam  Respiratory: clear to auscultation bilaterally  Abdominal: gravid uterus, non-tender to palpation  Pelvic: 2/70/-3  Extremities: no redness, tenderness or swelling in lower extremities bilaterally     FHT: baseline 140bpm, moderate variability, +accels, -deccels  [place]: irregular contractions q5 minutes  Ultrasound: vertex presentation Vital Signs Last 24 Hrs  T(C): 37 (03 Jan 2020 12:23), Max: 37 (03 Jan 2020 12:23)  T(F): 98.6 (03 Jan 2020 12:23), Max: 98.6 (03 Jan 2020 12:23)  HR: 83 (03 Jan 2020 12:25) (83 - 83)  BP: 123/80 (03 Jan 2020 12:25) (123/80 - 123/80)  RR: 14 (03 Jan 2020 12:23) (14 - 14)    General: Alert and oriented x3, no acute distress  Cardiovascular: regular rate and rhythm, no murmurs, rubs or gallops appreciated on exam  Respiratory: clear to auscultation bilaterally  Abdominal: gravid uterus, non-tender to palpation  Pelvic: 2/70/-3  Extremities: no redness, tenderness or swelling in lower extremities bilaterally     FHT: Baseline 140 bpm, moderate variability, +accels, -deccels  [place]: Contractions q5 minutes  Ultrasound: vertex presentation

## 2020-01-03 NOTE — OB PROVIDER H&P - ATTENDING COMMENTS
Ms. Tineo is a 28 y/o  at 39 1/7 wks GA, sent from [facility]'s office for an induction of labor for IUGR (7%). FHT: Category 1. [place]: Irregular ctx. SVE: /-3. OB sono today: EFW 2773 g (7), Normal umbilical dopplers, Elevated right uterine artery doppler with absent notching. Will start Pitocin for induction of labor. Induction process was discussed. GBS negative. She has h/o postpartum hemorrhage requiring D&C and blood transfusion with prior delivery. We will have a hemorrhage kit at bedside during her delivery. Consent obtained. All of the patient's questions and concerns were discussed.

## 2020-01-03 NOTE — OB PROVIDER DELIVERY SUMMARY - NSPROVIDERDELIVERYNOTE_OBGYN_ALL_OB_FT
Well controlled spontaneous vaginal delivery of a viable female , Weight: 2720 g, APGARs 9/9. Infant's head delivered in DRE position atraumatically. Nuchal cord x 2 was encountered, however was unable to be reduced prior to delivery since patient was unable to stop pushing and baby delivered precipitously. Infant was handed off to the patient for skin to skin. Cord gases were sent. Placenta was delivered intact and sent to pathology. Uterine atony was encountered and resolved with uterine massage, IV Pitocin, Methergine 0.2 mg IM x 1, Hemabate 250 mcg IM x 1, and Cytotec 1,000 mcg rectally. Patient had a superficial perineal laceration, which was hemostatic and unrepaired. No vaginal or cervical lacerations noted. Rectum was noted to be intact. No complications.

## 2020-01-04 ENCOUNTER — TRANSCRIPTION ENCOUNTER (OUTPATIENT)
Age: 30
End: 2020-01-04

## 2020-01-04 LAB
ALBUMIN SERPL ELPH-MCNC: 3 G/DL — LOW (ref 3.3–5.2)
ALP SERPL-CCNC: 141 U/L — HIGH (ref 40–120)
ALT FLD-CCNC: 7 U/L — SIGNIFICANT CHANGE UP
ANION GAP SERPL CALC-SCNC: 13 MMOL/L — SIGNIFICANT CHANGE UP (ref 5–17)
AST SERPL-CCNC: 17 U/L — SIGNIFICANT CHANGE UP
BASOPHILS # BLD AUTO: 0.03 K/UL — SIGNIFICANT CHANGE UP (ref 0–0.2)
BASOPHILS NFR BLD AUTO: 0.2 % — SIGNIFICANT CHANGE UP (ref 0–2)
BILIRUB SERPL-MCNC: <0.2 MG/DL — LOW (ref 0.4–2)
BUN SERPL-MCNC: 3 MG/DL — LOW (ref 8–20)
CALCIUM SERPL-MCNC: 7.4 MG/DL — LOW (ref 8.6–10.2)
CHLORIDE SERPL-SCNC: 104 MMOL/L — SIGNIFICANT CHANGE UP (ref 98–107)
CO2 SERPL-SCNC: 21 MMOL/L — LOW (ref 22–29)
CREAT SERPL-MCNC: 0.47 MG/DL — LOW (ref 0.5–1.3)
EOSINOPHIL # BLD AUTO: 0.01 K/UL — SIGNIFICANT CHANGE UP (ref 0–0.5)
EOSINOPHIL NFR BLD AUTO: 0.1 % — SIGNIFICANT CHANGE UP (ref 0–6)
FETAL SCREEN: SIGNIFICANT CHANGE UP
GLUCOSE SERPL-MCNC: 97 MG/DL — SIGNIFICANT CHANGE UP (ref 70–115)
HCT VFR BLD CALC: 29.6 % — LOW (ref 34.5–45)
HGB BLD-MCNC: 9.7 G/DL — LOW (ref 11.5–15.5)
IMM GRANULOCYTES NFR BLD AUTO: 0.5 % — SIGNIFICANT CHANGE UP (ref 0–1.5)
LYMPHOCYTES # BLD AUTO: 1.48 K/UL — SIGNIFICANT CHANGE UP (ref 1–3.3)
LYMPHOCYTES # BLD AUTO: 11.2 % — LOW (ref 13–44)
MAGNESIUM SERPL-MCNC: 4.8 MG/DL — HIGH (ref 1.6–2.6)
MAGNESIUM SERPL-MCNC: 5.6 MG/DL — HIGH (ref 1.6–2.6)
MCHC RBC-ENTMCNC: 26.6 PG — LOW (ref 27–34)
MCHC RBC-ENTMCNC: 32.8 GM/DL — SIGNIFICANT CHANGE UP (ref 32–36)
MCV RBC AUTO: 81.1 FL — SIGNIFICANT CHANGE UP (ref 80–100)
MEV IGG SER-ACNC: >300 AU/ML — SIGNIFICANT CHANGE UP
MEV IGG+IGM SER-IMP: POSITIVE — SIGNIFICANT CHANGE UP
MONOCYTES # BLD AUTO: 0.95 K/UL — HIGH (ref 0–0.9)
MONOCYTES NFR BLD AUTO: 7.2 % — SIGNIFICANT CHANGE UP (ref 2–14)
NEUTROPHILS # BLD AUTO: 10.69 K/UL — HIGH (ref 1.8–7.4)
NEUTROPHILS NFR BLD AUTO: 80.8 % — HIGH (ref 43–77)
PLATELET # BLD AUTO: 217 K/UL — SIGNIFICANT CHANGE UP (ref 150–400)
POTASSIUM SERPL-MCNC: 3.8 MMOL/L — SIGNIFICANT CHANGE UP (ref 3.5–5.3)
POTASSIUM SERPL-SCNC: 3.8 MMOL/L — SIGNIFICANT CHANGE UP (ref 3.5–5.3)
PROT SERPL-MCNC: 6 G/DL — LOW (ref 6.6–8.7)
RBC # BLD: 3.65 M/UL — LOW (ref 3.8–5.2)
RBC # FLD: 12.6 % — SIGNIFICANT CHANGE UP (ref 10.3–14.5)
SODIUM SERPL-SCNC: 138 MMOL/L — SIGNIFICANT CHANGE UP (ref 135–145)
T PALLIDUM AB TITR SER: NEGATIVE — SIGNIFICANT CHANGE UP
WBC # BLD: 13.22 K/UL — HIGH (ref 3.8–10.5)
WBC # FLD AUTO: 13.22 K/UL — HIGH (ref 3.8–10.5)

## 2020-01-04 RX ORDER — IBUPROFEN 200 MG
600 TABLET ORAL EVERY 6 HOURS
Refills: 0 | Status: DISCONTINUED | OUTPATIENT
Start: 2020-01-04 | End: 2020-01-05

## 2020-01-04 RX ADMIN — Medication 1 TABLET(S): at 12:52

## 2020-01-04 RX ADMIN — Medication 600 MILLIGRAM(S): at 09:44

## 2020-01-04 RX ADMIN — Medication 600 MILLIGRAM(S): at 08:44

## 2020-01-04 RX ADMIN — SODIUM CHLORIDE 75 MILLILITER(S): 9 INJECTION, SOLUTION INTRAVENOUS at 06:56

## 2020-01-04 NOTE — CHART NOTE - NSCHARTNOTEFT_GEN_A_CORE
S/O:  Patient evaluated at bedside for clinical magnesium check.   She denies headache, visual disturbances including scotoma, right upper quadrant pain, nausea/vomiting/epigastric pain, or shortness of breath.   Pain well controlled.     T(C): 36.9 (01-03-20 @ 23:09), Max: 37.0 (01-03-20 @ 16:30)  HR: 84 (01-04-20 @ 02:34) (57 - 95)  BP: 108/57 (01-04-20 @ 02:27) (106/56 - 175/71)  RR: 17 (01-03-20 @ 21:20) (14 - 17)  SpO2: 100% (01-04-20 @ 02:34) (88% - 100%)  Wt(kg): --    Gen: NAD, AAOx3  CV: RRR, no M/R/G  Pulm: CTAB, no R/R/W  Abd: soft, nontender, no rebound or guarding, no epigastric tenderness, liver nonpalpable +BS, fundus palpated   : Mccullough in place  Ext: SCDs in place, Reflexes 1+                          11.5   12.05 )-----------( 235      ( 03 Jan 2020 21:49 )             35.8     01-03    140  |  107  |  6.0<L>  ----------------------------<  79  3.5   |  19.0<L>  |  0.39<L>    Ca    9.0      03 Jan 2020 21:49  Mg     4.8     01-04    TPro  6.8  /  Alb  3.4  /  TBili  <0.2<L>  /  DBili  x   /  AST  15  /  ALT  8   /  AlkPhos  176<H>  01-03 01-03-20 @ 07:01  -  01-04-20 @ 02:37  --------------------------------------------------------  IN: 1625 mL / OUT: 3050 mL / NET: -1425 mL      A/P:  - Patient w/o s/s of magnesium toxicity  - Magnesium started at 20:00  - Last magnesium level at 1am was 4.8  - Next magnesium level at 7am  - Next magnesium check at 6am  - Repeat labs at 7am - CBC & CMP  - , 300, 600 cc/hr in the last 3 hours

## 2020-01-04 NOTE — PROGRESS NOTE ADULT - SUBJECTIVE AND OBJECTIVE BOX
Per Dr. Alamo, patient with BPs well controlled in postpartum period while on magnesium for severe range blood pressure during L&D.   Patient completed 8 hours of magnesium infusion without no signs of magnesium toxicity overnight.   Dr. Alamo reassessed patient at 7:30am and decided to discontinue magnesium at that time.   Mccullough will be removed and blood pressure monitoring will continue for 1-2 hrs before transfer to postpartum unit.     [place] [id number]  Dr. Alamo

## 2020-01-05 VITALS
RESPIRATION RATE: 18 BRPM | TEMPERATURE: 98 F | HEART RATE: 53 BPM | DIASTOLIC BLOOD PRESSURE: 69 MMHG | SYSTOLIC BLOOD PRESSURE: 117 MMHG

## 2020-01-05 PROCEDURE — 85730 THROMBOPLASTIN TIME PARTIAL: CPT

## 2020-01-05 PROCEDURE — 85384 FIBRINOGEN ACTIVITY: CPT

## 2020-01-05 PROCEDURE — 83735 ASSAY OF MAGNESIUM: CPT

## 2020-01-05 PROCEDURE — 80053 COMPREHEN METABOLIC PANEL: CPT

## 2020-01-05 PROCEDURE — G0463: CPT

## 2020-01-05 PROCEDURE — 85461 HEMOGLOBIN FETAL: CPT

## 2020-01-05 PROCEDURE — 86900 BLOOD TYPING SEROLOGIC ABO: CPT

## 2020-01-05 PROCEDURE — 85610 PROTHROMBIN TIME: CPT

## 2020-01-05 PROCEDURE — 59025 FETAL NON-STRESS TEST: CPT

## 2020-01-05 PROCEDURE — 84550 ASSAY OF BLOOD/URIC ACID: CPT

## 2020-01-05 PROCEDURE — 81001 URINALYSIS AUTO W/SCOPE: CPT

## 2020-01-05 PROCEDURE — 88307 TISSUE EXAM BY PATHOLOGIST: CPT

## 2020-01-05 PROCEDURE — 83615 LACTATE (LD) (LDH) ENZYME: CPT

## 2020-01-05 PROCEDURE — 59050 FETAL MONITOR W/REPORT: CPT

## 2020-01-05 PROCEDURE — 85027 COMPLETE CBC AUTOMATED: CPT

## 2020-01-05 PROCEDURE — 86765 RUBEOLA ANTIBODY: CPT

## 2020-01-05 PROCEDURE — 86780 TREPONEMA PALLIDUM: CPT

## 2020-01-05 PROCEDURE — 36415 COLL VENOUS BLD VENIPUNCTURE: CPT

## 2020-01-05 PROCEDURE — 86901 BLOOD TYPING SEROLOGIC RH(D): CPT

## 2020-01-05 PROCEDURE — 86850 RBC ANTIBODY SCREEN: CPT

## 2020-01-05 RX ORDER — IBUPROFEN 200 MG
1 TABLET ORAL
Qty: 28 | Refills: 0
Start: 2020-01-05 | End: 2020-01-11

## 2020-01-05 RX ADMIN — Medication 600 MILLIGRAM(S): at 00:35

## 2020-01-05 RX ADMIN — Medication 600 MILLIGRAM(S): at 01:34

## 2020-01-05 NOTE — DISCHARGE NOTE OB - MATERIALS PROVIDED
MMR Vaccination (VIS Pub Date: 2012)/Prescription for Breast Pump/[facility]  Screening Program/  Immunization Record/Breastfeeding Guide and Packet/Breastfeeding Log/Bottle Feeding Log/Guide to Postpartum Care/Tdap Vaccination (VIS Pub Date: 2012)/Back To Sleep Handout/Shaken Baby Prevention Handout/Birth Certificate Instructions/Vaccinations/Breastfeeding Mother’s Support Group Information/[facility] Hearing Screen Program/Discharge Medication Information for Patients and Families Pocket Guide

## 2020-01-05 NOTE — PROGRESS NOTE ADULT - SUBJECTIVE AND OBJECTIVE BOX
Vaginal Delivery Progress Note    JORGE MILES is a 29y  who is post-partum day#2  who delivered a female infant at _39w1d GA by vaginal delivery. Patient's prenatal course was complicated by IUGR and pre-eclampsia with severe features. Mag was started at 2000 after delivery and she was given hemabate, methergine and cytotec for hemostasis post- partum.    SUBJECTIVE:  No acute events overnight. Pain is well controlled with PRN pain medication. No problems with ambulating, voiding, or PO intake. Has had flatus but no BM. Denies nausea and vomiting. Patient is having normal lochia, which is decreasing.    She is breastfeeding and the baby is latching on.     OBJECTIVE:  Physical exam:  Vital Signs Last 24 Hrs  T(C): 37.3 (2020 20:00), Max: 37.3 (2020 20:00)  T(F): 99.2 (2020 20:00), Max: 99.2 (2020 20:00)  HR: 64 (2020 20:00) (64 - 85)  BP: 120/68 (2020 20:00) (103/67 - 120/68)  BP(mean): --  RR: --  SpO2: 99% (2020 20:00) (93% - 100%)  General: alert and oriented x3, no acute distress.  Heart: regular rate and rhythm, no murmurs, rubs or gallops appreciated.  Lungs: clear to auscultation bilaterally.   breast: soft, no tenderness to palpation.  Abdomen: Soft, appropriately tender to palpitation, firm uterine fundus at umbilicus.  Extremities: no tenderness, redness or swelling in lower extremities bilaterally.     Labs:                         9.7    13.22 )-----------( 217      ( 2020 07:30 )             29.6     PT/INR - ( 2020 21:50 )   PT: 9.9 sec;   INR: 0.86 ratio         PTT - ( 2020 21:50 )  PTT:26.6 sec

## 2020-01-05 NOTE — DISCHARGE NOTE OB - MEDICATION SUMMARY - MEDICATIONS TO TAKE
I will START or STAY ON the medications listed below when I get home from the hospital:    ibuprofen 600 mg oral tablet  -- 1 tab(s) by mouth every 6 hours   -- Do not take this drug if you are pregnant.  It is very important that you take or use this exactly as directed.  Do not skip doses or discontinue unless directed by your doctor.  May cause drowsiness or dizziness.  Obtain medical advice before taking any non-prescription drugs as some may affect the action of this medication.  Take with food or milk.    -- Indication: For  (normal spontaneous vaginal delivery)

## 2020-01-05 NOTE — DISCHARGE NOTE OB - CARE PROVIDER_API CALL
Jeri Tineo)  OBSGYN  [facility]  [address] [place] [address]  Phone: [phone]  Fax: [phone]  Follow Up Time: 2 weeks

## 2020-01-05 NOTE — DISCHARGE NOTE OB - CARE PLAN
Principal Discharge DX:	 (normal spontaneous vaginal delivery)  Goal:	Rapid recovery  Assessment and plan of treatment:	Please call your provider to schedule postpartum visit in 2-3 weeks. Take medications as directed, regular diet, activity as tolerated. Exclusive breast feeding for the first 6 months is recommended. Nothing per vagina for 6 weeks (incl. sex, douching, etc). If you have additional concerns, please inform your provider.

## 2020-01-05 NOTE — DISCHARGE NOTE OB - PLAN OF CARE
Rapid recovery Please call your provider to schedule postpartum visit in 2-3 weeks. Take medications as directed, regular diet, activity as tolerated. Exclusive breast feeding for the first 6 months is recommended. Nothing per vagina for 6 weeks (incl. sex, douching, etc). If you have additional concerns, please inform your provider.

## 2020-01-05 NOTE — PROGRESS NOTE ADULT - ASSESSMENT
ASSESSMENT  JORGE MILES is a 29y  who is post-partum day#2  who delivered a female infant at _39w1d GA by vaginal delivery. Patient's prenatal course was complicated by IUGR and pre-eclampsia with severe features. Mag was started at 2000 after delivery and she was given hemabate, methergine and cytotec for hemostasis post- partum.No acute events.     PLAN  1. Routine post-partum care  - Continue to encourage ambulation, PO intake and breastfeeding  - DVT prophylaxis SCDs and ambulation  - Continue pain management PRN.   - Plan to discharge post-partum day 2

## 2020-01-05 NOTE — DISCHARGE NOTE OB - PATIENT PORTAL LINK FT
You can access the FollowMyHealth Patient Portal offered by [facility] by registering at the following website: [email or web address]. By joining [facility]’s FollowMyHealth portal, you will also be able to view your health information using other applications (apps) compatible with our system.

## 2020-01-05 NOTE — DISCHARGE NOTE OB - HOSPITAL COURSE
Patient is a 28yo  delivered via spontaneous vaginal delivery. She was transferred to postpartum unit without complications during her stay. Upon discharge she is voiding, tolerating PO, ambulating, and pain is well controlled.

## 2020-01-05 NOTE — DISCHARGE NOTE OB - MEDICATION SUMMARY - MEDICATIONS TO STOP TAKING
I will STOP taking the medications listed below when I get home from the hospital:    acetaminophen-oxyCODONE 325 mg-5 mg oral tablet  -- 1 tab(s) by mouth every 4 hours, As needed, Moderate Pain    chlorhexidine 0.12% mucous membrane liquid  -- 15 milliliter(s) by mouth 4 times a day rinse and spit.

## 2020-01-06 PROBLEM — O03.9 COMPLETE OR UNSPECIFIED SPONTANEOUS ABORTION WITHOUT COMPLICATION: Chronic | Status: ACTIVE | Noted: 2020-01-03

## 2020-01-13 LAB — SURGICAL PATHOLOGY STUDY: SIGNIFICANT CHANGE UP

## 2020-01-17 ENCOUNTER — APPOINTMENT (OUTPATIENT)
Dept: OBGYN | Facility: CLINIC | Age: 30
End: 2020-01-17
Payer: COMMERCIAL

## 2020-01-17 VITALS
WEIGHT: 130 LBS | HEIGHT: 64 IN | DIASTOLIC BLOOD PRESSURE: 70 MMHG | SYSTOLIC BLOOD PRESSURE: 118 MMHG | BODY MASS INDEX: 22.2 KG/M2

## 2020-01-17 DIAGNOSIS — Z34.92 ENCOUNTER FOR SUPERVISION OF NORMAL PREGNANCY, UNSPECIFIED, SECOND TRIMESTER: ICD-10-CM

## 2020-01-17 DIAGNOSIS — Z87.42 PERSONAL HISTORY OF OTHER DISEASES OF THE FEMALE GENITAL TRACT: ICD-10-CM

## 2020-01-17 DIAGNOSIS — Z3A.33 33 WEEKS GESTATION OF PREGNANCY: ICD-10-CM

## 2020-01-17 DIAGNOSIS — Z3A.27 27 WEEKS GESTATION OF PREGNANCY: ICD-10-CM

## 2020-01-17 DIAGNOSIS — Z3A.29 29 WEEKS GESTATION OF PREGNANCY: ICD-10-CM

## 2020-01-17 DIAGNOSIS — O26.872 CERVICAL SHORTENING, SECOND TRIMESTER: ICD-10-CM

## 2020-01-17 DIAGNOSIS — Z3A.31 31 WEEKS GESTATION OF PREGNANCY: ICD-10-CM

## 2020-01-17 PROCEDURE — 99212 OFFICE O/P EST SF 10 MIN: CPT | Mod: 24

## 2020-01-17 RX ORDER — AZITHROMYCIN 500 MG/1
500 TABLET, FILM COATED ORAL ONCE
Qty: 2 | Refills: 0 | Status: DISCONTINUED | COMMUNITY
Start: 2019-06-07 | End: 2020-01-17

## 2020-01-17 RX ORDER — PROGESTERONE 90 MG/1.125G
8 GEL VAGINAL DAILY
Qty: 2 | Refills: 4 | Status: DISCONTINUED | COMMUNITY
Start: 2019-08-28 | End: 2020-01-17

## 2020-01-17 RX ORDER — METRONIDAZOLE 500 MG/1
500 TABLET ORAL
Qty: 4 | Refills: 0 | Status: DISCONTINUED | COMMUNITY
Start: 2019-06-10 | End: 2020-01-17

## 2020-01-17 RX ORDER — METRONIDAZOLE 7.5 MG/G
0.75 GEL VAGINAL
Qty: 1 | Refills: 0 | Status: DISCONTINUED | COMMUNITY
Start: 2019-09-25 | End: 2020-01-17

## 2020-01-17 RX ORDER — CEFTRIAXONE 250 MG/1
250 INJECTION, POWDER, FOR SOLUTION INTRAMUSCULAR; INTRAVENOUS
Qty: 1 | Refills: 0 | Status: DISCONTINUED | COMMUNITY
Start: 2019-06-07 | End: 2020-01-17

## 2020-01-17 RX ORDER — METRONIDAZOLE 7.5 MG/G
0.75 GEL VAGINAL
Qty: 1 | Refills: 0 | Status: DISCONTINUED | COMMUNITY
Start: 2019-06-08 | End: 2020-01-17

## 2020-01-17 RX ORDER — INDOMETHACIN 25 MG/1
25 CAPSULE ORAL
Qty: 23 | Refills: 0 | Status: DISCONTINUED | COMMUNITY
Start: 2019-09-18 | End: 2020-01-17

## 2020-01-17 NOTE — HISTORY OF PRESENT ILLNESS
[Good] : being in good health [Healthy Diet] : a healthy diet [Reproductive Age] : is of reproductive age [Pregnancy History] : pregnancy history: [Total Preg ___] : [unfilled] [Full Term ___] : [unfilled] [Living ___] : [unfilled] [AB Induced ___] : elective abortions: [unfilled] [Definite:  ___ (Date)] : the last menstrual period was [unfilled] [Menarche Age: ____] : age at menarche was [unfilled] [Regular Exercise] : not exercising regularly [Weight Concerns] : no concerns with her weight [Menstrual Problems] : reports normal menses [Contraception] : does not use contraception [Fever] : no fever [Nausea] : no nausea [Vomiting] : no vomiting [Diarrhea] : no diarrhea [Vaginal Bleeding] : no vaginal bleeding [Pelvic Pressure] : no pelvic pressure [Dysuria] : no dysuria [Sexually Active] : is not sexually active

## 2020-01-17 NOTE — PHYSICAL EXAM
[Awake] : awake [Alert] : alert [Acute Distress] : no acute distress [Soft] : soft [Tender] : non tender [Distended] : not distended [Oriented x3] : oriented to person, place, and time [Depressed Mood] : not depressed [Flat Affect] : affect not flat [No Lesions] : no genitalia lesions [Labia Majora Erythema] : no erythema of the labia majora [Labia Minora Erythema] : no erythema of the labia minora [Normal] : cervix [Labia Majora] : labia major [Labia Minora] : labia minora [Pink Rugae] : pink rugae [Discharge] : a  ~M vaginal discharge was present [Scant] : scant [Foul Smelling] : not foul smelling [White] : white [Thin] : thin [No Bleeding] : there was no active vaginal bleeding

## 2020-01-17 NOTE — CHIEF COMPLAINT
[Post-Partum Visit] : post-partum visit [FreeTextEntry1] : 2 week post partum;  2020 Female " Reign" 6lbs Formula Feeding

## 2020-01-22 LAB
BILIRUB UR QL STRIP: NORMAL
BILIRUB UR QL STRIP: NORMAL
CANDIDA VAG CYTO: NOT DETECTED
G VAGINALIS+PREV SP MTYP VAG QL MICRO: DETECTED
GLUCOSE UR-MCNC: NORMAL
GLUCOSE UR-MCNC: NORMAL
HCG UR QL: 0.2 EU/DL
HCG UR QL: 1 EU/DL
HGB UR QL STRIP.AUTO: NORMAL
HGB UR QL STRIP.AUTO: NORMAL
KETONES UR-MCNC: NORMAL
KETONES UR-MCNC: NORMAL
LEUKOCYTE ESTERASE UR QL STRIP: ABNORMAL
LEUKOCYTE ESTERASE UR QL STRIP: NORMAL
NITRITE UR QL STRIP: NORMAL
NITRITE UR QL STRIP: NORMAL
PH UR STRIP: 6
PH UR STRIP: 6.5
PROT UR STRIP-MCNC: ABNORMAL
PROT UR STRIP-MCNC: NORMAL
SP GR UR STRIP: 1.02
SP GR UR STRIP: 1.03
T VAGINALIS VAG QL WET PREP: NOT DETECTED

## 2020-01-31 LAB
B-HEM STREP SPEC QL CULT: NORMAL
HIV1+2 AB SPEC QL IA.RAPID: NONREACTIVE
RUBV IGG FLD-ACNC: 1.2 INDEX
RUBV IGG SER-IMP: POSITIVE
RUBV IGM FLD-ACNC: <20 AU/ML

## 2020-02-14 ENCOUNTER — APPOINTMENT (OUTPATIENT)
Dept: OBGYN | Facility: CLINIC | Age: 30
End: 2020-02-14
Payer: MEDICAID

## 2020-02-14 ENCOUNTER — RESULT CHARGE (OUTPATIENT)
Age: 30
End: 2020-02-14

## 2020-02-14 VITALS
SYSTOLIC BLOOD PRESSURE: 110 MMHG | BODY MASS INDEX: 22.2 KG/M2 | WEIGHT: 130 LBS | DIASTOLIC BLOOD PRESSURE: 72 MMHG | HEIGHT: 64 IN

## 2020-02-14 DIAGNOSIS — Z78.9 OTHER SPECIFIED HEALTH STATUS: ICD-10-CM

## 2020-02-14 DIAGNOSIS — M79.643 PAIN IN UNSPECIFIED HAND: ICD-10-CM

## 2020-02-14 DIAGNOSIS — Z3A.14 14 WEEKS GESTATION OF PREGNANCY: ICD-10-CM

## 2020-02-14 DIAGNOSIS — Z3A.24 24 WEEKS GESTATION OF PREGNANCY: ICD-10-CM

## 2020-02-14 DIAGNOSIS — Z32.01 ENCOUNTER FOR PREGNANCY TEST, RESULT POSITIVE: ICD-10-CM

## 2020-02-14 DIAGNOSIS — Z11.3 ENCOUNTER FOR SCREENING FOR INFECTIONS WITH A PREDOMINANTLY SEXUAL MODE OF TRANSMISSION: ICD-10-CM

## 2020-02-14 DIAGNOSIS — Z3A.18 18 WEEKS GESTATION OF PREGNANCY: ICD-10-CM

## 2020-02-14 DIAGNOSIS — S62.309A UNSPECIFIED FRACTURE OF UNSPECIFIED METACARPAL BONE, INITIAL ENCOUNTER FOR CLOSED FRACTURE: ICD-10-CM

## 2020-02-14 DIAGNOSIS — N91.1 SECONDARY AMENORRHEA: ICD-10-CM

## 2020-02-14 LAB
BILIRUB UR QL STRIP: NORMAL
CLARITY UR: CLEAR
COLLECTION METHOD: NORMAL
GLUCOSE UR-MCNC: NORMAL
HCG UR QL: 0.2 EU/DL
HCG UR QL: NEGATIVE
HGB UR QL STRIP.AUTO: NORMAL
KETONES UR-MCNC: NORMAL
LEUKOCYTE ESTERASE UR QL STRIP: NORMAL
NITRITE UR QL STRIP: NORMAL
PH UR STRIP: 5.5
PROT UR STRIP-MCNC: NORMAL
QUALITY CONTROL: YES
SP GR UR STRIP: 1.02

## 2020-02-14 NOTE — HISTORY OF PRESENT ILLNESS
[1 Year Ago] : 1 year ago [Good] : being in good health [Healthy Diet] : a healthy diet [Regular Exercise] : regular exercise [Last Pap ___] : Last cervical pap smear was [unfilled] [Reproductive Age] : is of reproductive age [Menstrual Problems] : reports abnormal menses [Total Preg ___] : [unfilled] [Living ___] : [unfilled] [Definite:  ___ (Date)] : the last menstrual period was [unfilled] [Menarche Age: ____] : age at menarche was [unfilled] [Sexually Active] : is sexually active [Male ___] : [unfilled] male [No] : no [Pregnancy History] : denies prior pregnancies [Burning] : no burning [Itching] : no itching [Mass] : no mass [Stinging] : no stinging [Lesion] : no lesion [Soreness] : no soreness [Discharge] : no discharge [Localized Pain] : no localized pain [Mass (___cm)] : no palpable mass [Diffused Pain] : no diffused pain [Skin Color Change] : no skin color change [Nipple Discharge] : no nipple discharge [Night Sweats] : no night sweats [Hot Flashes] : no hot flashes [Contraception] : does not use contraception

## 2020-02-14 NOTE — PHYSICAL EXAM
[Awake] : awake [Alert] : alert [Mass] : no breast mass [Acute Distress] : no acute distress [Nipple Discharge] : no nipple discharge [Axillary LAD] : no axillary lymphadenopathy [Soft] : soft [Tender] : non tender [Distended] : not distended [Oriented x3] : oriented to person, place, and time [Depressed Mood] : not depressed [Flat Affect] : affect not flat [No Lesions] : no genitalia lesions [Labia Majora Erythema] : no erythema of the labia majora [Labia Minora Erythema] : no erythema of the labia minora [Normal] : uterus [Labia Minora] : labia minora [Labia Majora] : labia major [No Bleeding] : there was no active vaginal bleeding [Pink Rugae] : pink rugae [Motion Tenderness] : there was no cervical motion tenderness [Tenderness] : nontender [Enlarged ___ wks] : not enlarged [Mass ___ cm] : no uterine mass was palpated [Uterine Adnexae] : were not tender and not enlarged [Adnexa Tenderness] : were not tender [Ovarian Mass (___ Cm)] : there were no adnexal masses

## 2020-03-13 ENCOUNTER — APPOINTMENT (OUTPATIENT)
Dept: OBGYN | Facility: CLINIC | Age: 30
End: 2020-03-13
Payer: MEDICAID

## 2020-03-13 VITALS
SYSTOLIC BLOOD PRESSURE: 108 MMHG | HEIGHT: 64 IN | DIASTOLIC BLOOD PRESSURE: 62 MMHG | WEIGHT: 129 LBS | BODY MASS INDEX: 22.02 KG/M2

## 2020-03-13 DIAGNOSIS — Z78.9 OTHER SPECIFIED HEALTH STATUS: ICD-10-CM

## 2020-03-13 DIAGNOSIS — Z30.9 ENCOUNTER FOR CONTRACEPTIVE MANAGEMENT, UNSPECIFIED: ICD-10-CM

## 2020-03-13 DIAGNOSIS — Z01.419 ENCOUNTER FOR GYNECOLOGICAL EXAMINATION (GENERAL) (ROUTINE) W/OUT ABNORMAL FINDINGS: ICD-10-CM

## 2020-03-13 DIAGNOSIS — F17.200 NICOTINE DEPENDENCE, UNSPECIFIED, UNCOMPLICATED: ICD-10-CM

## 2020-03-13 PROCEDURE — 99395 PREV VISIT EST AGE 18-39: CPT

## 2020-03-13 RX ORDER — NORETHINDRONE ACETATE AND ETHINYL ESTRADIOL AND FERROUS FUMARATE 1MG-20(21)
1-20 KIT ORAL DAILY
Qty: 1 | Refills: 8 | Status: DISCONTINUED | COMMUNITY
Start: 2020-02-14 | End: 2020-03-13

## 2020-03-13 RX ORDER — METRONIDAZOLE 7.5 MG/G
0.75 GEL VAGINAL
Qty: 1 | Refills: 0 | Status: DISCONTINUED | COMMUNITY
Start: 2020-01-22 | End: 2020-03-13

## 2020-03-13 NOTE — PHYSICAL EXAM
[Awake] : awake [Alert] : alert [Soft] : soft [Oriented x3] : oriented to person, place, and time [No Lesions] : no genitalia lesions [Normal] : uterus [Labia Majora] : labia major [Labia Minora] : labia minora [Pink Rugae] : pink rugae [No Bleeding] : there was no active vaginal bleeding [Pap Obtained] : a Pap smear was performed [Normal Position] : in a normal position [Uterine Adnexae] : were not tender and not enlarged [Acute Distress] : no acute distress [Mass] : no breast mass [Nipple Discharge] : no nipple discharge [Axillary LAD] : no axillary lymphadenopathy [Tender] : non tender [Distended] : not distended [Depressed Mood] : not depressed [Flat Affect] : affect not flat [Labia Majora Erythema] : no erythema of the labia majora [Labia Minora Erythema] : no erythema of the labia minora [Motion Tenderness] : there was no cervical motion tenderness [Tenderness] : nontender [Enlarged ___ wks] : not enlarged [Mass ___ cm] : no uterine mass was palpated [Adnexa Tenderness] : were not tender [Ovarian Mass (___ Cm)] : there were no adnexal masses

## 2020-03-13 NOTE — HISTORY OF PRESENT ILLNESS
[Good] : being in good health [Healthy Diet] : a healthy diet [Regular Exercise] : regular exercise [Reproductive Age] : is of reproductive age [Pregnancy History] : pregnancy history: [Total Preg ___] : [unfilled] [Full Term ___] : [unfilled] [Living ___] : [unfilled] [AB Induced ___] : elective abortions: [unfilled] [Definite:  ___ (Date)] : the last menstrual period was [unfilled] [Menarche Age: ____] : age at menarche was [unfilled] [Sexually Active] : is sexually active [Contraception] : uses contraception [Condoms] : uses condoms [Male ___] : [unfilled] male [Last Pap ___] : Last cervical pap smear was [unfilled] [Monogamous] : is monogamous [Weight Concerns] : no concerns with her weight [Menstrual Problems] : reports normal menses

## 2020-03-28 LAB — CYTOLOGY CVX/VAG DOC THIN PREP: ABNORMAL

## 2020-12-23 PROBLEM — Z01.419 ENCOUNTER FOR ANNUAL ROUTINE GYNECOLOGICAL EXAMINATION: Status: RESOLVED | Noted: 2020-03-13 | Resolved: 2020-12-23

## 2021-02-02 NOTE — ED ADULT TRIAGE NOTE - AS HEIGHT TYPE
Problem: Adult Inpatient Plan of Care  Goal: Plan of Care Review  Outcome: Ongoing, Progressing  Flowsheets (Taken 2/2/2021 1255)  Progress: improving  Plan of Care Reviewed With: patient  Outcome Summary: pt responded well to PT w/ increased gait from previous tx to 30 ft min A w/ RW. multiple gait deviations with R LE and pt required assist to advance R LE with first few steps. pt participated in LE ther ex with stretch at end range and manual resistance with eccentric contraction. pt emptied his own colostomy at EOB. vitals WFL throughout tx. no new goals met at this time. pt would continue to benefit from PT services.     stated

## 2021-06-28 NOTE — OB RN PATIENT PROFILE - HOW PATIENT ADDRESSED, OB PROFILE
Jaime
36 y/o F with no pertinent PMHx  presents to the ED c/o suprapubic pain, epigastric pain, and left lower back pain for the past three days. Denies fever, chills, nausea, vomiting, diarrhea, urinary symptoms, dyspepsia, chest pain, or SOB.

## 2022-01-05 NOTE — OB RN PATIENT PROFILE - NS PRO TALK SOMEONE YN
----- Message from Pari Camp sent at 1/5/2022 10:58 AM EST -----  Regarding: CARDIAC CLEARANCE FOR COLONOSCOPY ON 1/11  LONA JHA/[facility] [phone]  FAX [phone]    Will discuss with Dr. PAUL     Per Dr. PAUL patient needs an office visit and TMT before clearance.       
no

## 2022-06-02 NOTE — OB HISTORY
[___] : Living: [unfilled] [place] Counseling:  I discussed with the patient the risks of imiquimod including but not limited to erythema, scaling, itching, weeping, crusting, and pain.  Patient understands that the inflammatory response to imiquimod is variable from person to person and was educated regarded proper titration schedule.  If flu-like symptoms develop, patient knows to discontinue the medication and contact us.

## 2023-02-23 ENCOUNTER — APPOINTMENT (OUTPATIENT)
Dept: OBGYN | Facility: CLINIC | Age: 33
End: 2023-02-23
Payer: MEDICAID

## 2023-02-23 ENCOUNTER — NON-APPOINTMENT (OUTPATIENT)
Age: 33
End: 2023-02-23

## 2023-02-23 ENCOUNTER — RESULT CHARGE (OUTPATIENT)
Age: 33
End: 2023-02-23

## 2023-02-23 VITALS
HEIGHT: 64 IN | DIASTOLIC BLOOD PRESSURE: 60 MMHG | WEIGHT: 119 LBS | BODY MASS INDEX: 20.32 KG/M2 | SYSTOLIC BLOOD PRESSURE: 110 MMHG

## 2023-02-23 DIAGNOSIS — Z01.411 ENCOUNTER FOR GYNECOLOGICAL EXAMINATION (GENERAL) (ROUTINE) WITH ABNORMAL FINDINGS: ICD-10-CM

## 2023-02-23 DIAGNOSIS — Z11.3 ENCOUNTER FOR SCREENING FOR INFECTIONS WITH A PREDOMINANTLY SEXUAL MODE OF TRANSMISSION: ICD-10-CM

## 2023-02-23 DIAGNOSIS — Z12.4 ENCOUNTER FOR SCREENING FOR MALIGNANT NEOPLASM OF CERVIX: ICD-10-CM

## 2023-02-23 LAB
BILIRUB UR QL STRIP: NORMAL
GLUCOSE UR-MCNC: NORMAL
HCG UR QL: 1 EU/DL
HCG UR QL: POSITIVE
HGB UR QL STRIP.AUTO: ABNORMAL
KETONES UR-MCNC: ABNORMAL
LEUKOCYTE ESTERASE UR QL STRIP: ABNORMAL
NITRITE UR QL STRIP: NORMAL
PH UR STRIP: 5.5
PROT UR STRIP-MCNC: ABNORMAL
QUALITY CONTROL: YES
SP GR UR STRIP: >=1.03

## 2023-02-23 PROCEDURE — 99395 PREV VISIT EST AGE 18-39: CPT

## 2023-02-23 PROCEDURE — 81025 URINE PREGNANCY TEST: CPT

## 2023-02-23 PROCEDURE — 81003 URINALYSIS AUTO W/O SCOPE: CPT | Mod: QW

## 2023-02-23 PROCEDURE — 99213 OFFICE O/P EST LOW 20 MIN: CPT | Mod: 25

## 2023-02-23 RX ORDER — NORETHINDRONE 0.35 MG/1
0.35 TABLET ORAL DAILY
Qty: 1 | Refills: 5 | Status: DISCONTINUED | COMMUNITY
Start: 2020-03-13 | End: 2023-02-23

## 2023-02-23 RX ORDER — PRENATAL VIT NO.130/IRON/FOLIC 27MG-0.8MG
TABLET ORAL
Refills: 0 | Status: DISCONTINUED | COMMUNITY
End: 2023-02-23

## 2023-02-23 NOTE — HISTORY OF PRESENT ILLNESS
[Y] : Positive pregnancy history [Menarche Age: ____] : age at menarche was [unfilled] [Currently Active] : currently active [N] : Patient does not use contraception [No] : Patient does not have concerns regarding sex [Patient would like to be screened for STIs] : Patient would like to be screened for STIs [PapSmeardate] : 03/28/20 [TextBox_31] : NEG [HIVDate] : 06/13/19 [TextBox_53] : NEG [SyphilisDate] : 06/13/19 [TextBox_58] : NEG [GonorrheaDate] : 06/08/19 [TextBox_63] : NEG [ChlamydiaDate] : 06/08/19 [TextBox_68] : NEG [HepatitisBDate] : 06/13/19 [TextBox_83] : NEG [HepatitisCDate] : 06/13/19 [TextBox_88] : NEG [LMPDate] : 01/05/23 [PGxTotal] : 6 [[facility]] : 2 [PGHxAbortions] : 3 [[facility]xLiving] : 2 [FreeTextEntry1] : 01/05/23

## 2023-02-23 NOTE — REVIEW OF SYSTEMS
[Patient Intake Form Reviewed] : Patient intake form was reviewed [FreeTextEntry8] : vaginal discharge and odor

## 2023-02-23 NOTE — COUNSELING
[Breast Self Exam] : breast self exam [Contraception/ Emergency Contraception/ Safe Sexual Practices] : contraception, emergency contraception, safe sexual practices [Pregnancy Options] : pregnancy options [STD (testing, results, tx)] : STD (testing, results, tx)

## 2023-02-23 NOTE — DISCUSSION/SUMMARY
[FreeTextEntry1] : -Pap and STD testing completed today, will f/u results\par -Affirm culture collected due to discharge/odor, rx for metronidazole and diflucan sent to pharmacy. Oral treatment due to procedure scheduled tomorrow. Will f/u results\par -Pt declines contraception\par -Encouraged self breast exams\par -FU annually or sooner as needed, all questions addressed

## 2023-02-23 NOTE — PHYSICAL EXAM
[Chaperone Present] : A chaperone was present in the examining room during all aspects of the physical examination [FreeTextEntry1] : Rohini MUÑOZ MA  [Appropriately responsive] : appropriately responsive [Alert] : alert [No Acute Distress] : no acute distress [Oriented x3] : oriented x3 [Examination Of The Breasts] : a normal appearance [No Masses] : no breast masses were palpable [Labia Majora] : normal [Labia Minora] : normal [Discharge] : a  ~M vaginal discharge was present [Moderate] : moderate [No Bleeding] : There was no active vaginal bleeding [Normal] : normal [Uterine Adnexae] : normal

## 2023-02-28 LAB
C TRACH RRNA SPEC QL NAA+PROBE: NOT DETECTED
CANDIDA VAG CYTO: NOT DETECTED
CYTOLOGY CVX/VAG DOC THIN PREP: ABNORMAL
G VAGINALIS+PREV SP MTYP VAG QL MICRO: DETECTED
HAV IGM SER QL: NONREACTIVE
HBV CORE IGM SER QL: NONREACTIVE
HBV SURFACE AG SER QL: NONREACTIVE
HCV AB SER QL: NONREACTIVE
HCV S/CO RATIO: 0.08 S/CO
HIV1+2 AB SPEC QL IA.RAPID: NONREACTIVE
HPV HIGH+LOW RISK DNA PNL CVX: NOT DETECTED
HSV 1+2 IGG SER IA-IMP: NEGATIVE
HSV 1+2 IGG SER IA-IMP: POSITIVE
HSV1 IGG SER QL: 45 INDEX
HSV2 IGG SER QL: 0.2 INDEX
N GONORRHOEA RRNA SPEC QL NAA+PROBE: NOT DETECTED
SOURCE TP AMPLIFICATION: NORMAL
T PALLIDUM AB SER QL IA: NEGATIVE
T VAGINALIS VAG QL WET PREP: DETECTED

## 2023-03-16 ENCOUNTER — APPOINTMENT (OUTPATIENT)
Dept: OBGYN | Facility: CLINIC | Age: 33
End: 2023-03-16
Payer: MEDICAID

## 2023-03-16 ENCOUNTER — ASOB RESULT (OUTPATIENT)
Age: 33
End: 2023-03-16

## 2023-03-16 ENCOUNTER — APPOINTMENT (OUTPATIENT)
Dept: ANTEPARTUM | Facility: CLINIC | Age: 33
End: 2023-03-16
Payer: MEDICAID

## 2023-03-16 VITALS
DIASTOLIC BLOOD PRESSURE: 64 MMHG | HEIGHT: 64 IN | SYSTOLIC BLOOD PRESSURE: 108 MMHG | WEIGHT: 119 LBS | BODY MASS INDEX: 20.32 KG/M2

## 2023-03-16 PROCEDURE — 76830 TRANSVAGINAL US NON-OB: CPT

## 2023-03-16 PROCEDURE — 99213 OFFICE O/P EST LOW 20 MIN: CPT

## 2023-03-16 NOTE — DISCUSSION/SUMMARY
[FreeTextEntry1] : Sono reviewed with pt, pt to monitor bleeding. Precautions reviewed. \par HCG level drawn today\par Pt declines contraception\par FU in 1 week for LINDA

## 2023-03-16 NOTE — HISTORY OF PRESENT ILLNESS
[N] : Patient does not use contraception [Y] : Positive pregnancy history [Menarche Age: ____] : age at menarche was [unfilled] [No] : Patient does not have concerns regarding sex [Currently Active] : currently active [PapSmeardate] : 02/23/23 [TextBox_31] : NEG  [GonorrheaDate] : 02/23/23 [TextBox_63] : NEG  [ChlamydiaDate] : 02/23/23 [TextBox_68] : NEG  [HPVDate] : 02/23/23 [TextBox_78] : NEG  [LMPDate] : 12/01/22 [PGxTotal] : 6 [[facility]] : 2 [PGHxAbortions] : 2 [[facility]] : 2 [PGHxABInduced] : 4 [FreeTextEntry1] : 12/01/22

## 2023-03-17 ENCOUNTER — NON-APPOINTMENT (OUTPATIENT)
Age: 33
End: 2023-03-17

## 2023-03-17 LAB — HCG SERPL-MCNC: 49 MIU/ML

## 2023-03-23 ENCOUNTER — APPOINTMENT (OUTPATIENT)
Dept: OBGYN | Facility: CLINIC | Age: 33
End: 2023-03-23
Payer: MEDICAID

## 2023-03-23 VITALS
BODY MASS INDEX: 20.32 KG/M2 | HEIGHT: 64 IN | WEIGHT: 119 LBS | SYSTOLIC BLOOD PRESSURE: 120 MMHG | DIASTOLIC BLOOD PRESSURE: 68 MMHG

## 2023-03-23 DIAGNOSIS — A59.01 TRICHOMONAL VULVOVAGINITIS: ICD-10-CM

## 2023-03-23 PROCEDURE — 99213 OFFICE O/P EST LOW 20 MIN: CPT

## 2023-03-23 NOTE — HISTORY OF PRESENT ILLNESS
[N] : Patient does not use contraception [Y] : Positive pregnancy history [Menarche Age: ____] : age at menarche was [unfilled] [Currently Active] : currently active [PapSmeardate] : 02/23/23 [TextBox_31] : NEG [HIVDate] : 02/23/23 [TextBox_53] : NEG [SyphilisDate] : 02/23/23 [TextBox_58] : NEG [GonorrheaDate] : 02/23/23 [TextBox_63] : NEG [ChlamydiaDate] : 02/23/23 [TextBox_68] : NEG [TrichomonasDate] : 02/23/23 [TextBox_73] : POS [HPVDate] : 02/23/23 [TextBox_78] : NEG [HepatitisBDate] : 02/23/23 [TextBox_83] : NEG [HepatitisCDate] : 02/23/23 [TextBox_88] : NEG [LMPDate] : 12/01/22 [PGxTotal] : 6 [[facility]] : 2 [PGHxAbortions] : 4 [[facility]] : 2 [FreeTextEntry1] : 12/01/22

## 2023-03-23 NOTE — DISCUSSION/SUMMARY
[FreeTextEntry1] : Culture collected today, will f/u results.\par \par HCG level drawn, pending result will repeat and trend to negative. All questions answered.\par

## 2023-03-23 NOTE — PHYSICAL EXAM
[Chaperone Present] : A chaperone was present in the examining room during all aspects of the physical examination [FreeTextEntry1] : Rohini MUÑOZ MA  [Appropriately responsive] : appropriately responsive [Alert] : alert [No Acute Distress] : no acute distress [Oriented x3] : oriented x3 [Labia Majora] : normal [Labia Minora] : normal [No Bleeding] : There was no active vaginal bleeding [Normal] : normal

## 2023-03-28 LAB
CANDIDA VAG CYTO: NOT DETECTED
G VAGINALIS+PREV SP MTYP VAG QL MICRO: NOT DETECTED
HCG SERPL-MCNC: 18 MIU/ML
T VAGINALIS VAG QL WET PREP: NOT DETECTED

## 2023-03-31 ENCOUNTER — APPOINTMENT (OUTPATIENT)
Dept: OBGYN | Facility: CLINIC | Age: 33
End: 2023-03-31
Payer: MEDICAID

## 2023-03-31 PROCEDURE — 36415 COLL VENOUS BLD VENIPUNCTURE: CPT

## 2023-04-04 ENCOUNTER — NON-APPOINTMENT (OUTPATIENT)
Age: 33
End: 2023-04-04

## 2023-04-05 PROBLEM — Z67.91 BLOOD TYPE, RH NEGATIVE: Status: ACTIVE | Noted: 2019-10-14

## 2023-04-06 ENCOUNTER — APPOINTMENT (OUTPATIENT)
Dept: OBGYN | Facility: CLINIC | Age: 33
End: 2023-04-06
Payer: MEDICAID

## 2023-04-06 VITALS
WEIGHT: 120 LBS | SYSTOLIC BLOOD PRESSURE: 116 MMHG | DIASTOLIC BLOOD PRESSURE: 72 MMHG | TEMPERATURE: 97 F | HEIGHT: 64 IN | BODY MASS INDEX: 20.49 KG/M2

## 2023-04-06 DIAGNOSIS — N89.8 OTHER SPECIFIED NONINFLAMMATORY DISORDERS OF VAGINA: ICD-10-CM

## 2023-04-06 DIAGNOSIS — Z09 ENCOUNTER FOR FOLLOW-UP EXAMINATION AFTER COMPLETED TREATMENT FOR CONDITIONS OTHER THAN MALIGNANT NEOPLASM: ICD-10-CM

## 2023-04-06 LAB — HCG SERPL-MCNC: 6 MIU/ML

## 2023-04-06 PROCEDURE — 99213 OFFICE O/P EST LOW 20 MIN: CPT

## 2023-04-06 RX ORDER — FLUCONAZOLE 150 MG/1
150 TABLET ORAL
Qty: 2 | Refills: 0 | Status: DISCONTINUED | COMMUNITY
Start: 2023-02-23 | End: 2023-04-06

## 2023-04-06 RX ORDER — METRONIDAZOLE 500 MG/1
500 TABLET ORAL TWICE DAILY
Qty: 14 | Refills: 0 | Status: DISCONTINUED | COMMUNITY
Start: 2023-02-23 | End: 2023-04-06

## 2023-04-06 NOTE — PHYSICAL EXAM
[Chaperone Present] : A chaperone was present in the examining room during all aspects of the physical examination [Appropriately responsive] : appropriately responsive [Alert] : alert [No Acute Distress] : no acute distress [Oriented x3] : oriented x3 [FreeTextEntry1] : VENANCIO Donaldson  [Labia Majora] : normal [Labia Minora] : normal [Discharge] : a  ~M vaginal discharge was present [Scant] : scant [Brown] : brown [Normal] : normal [Tenderness] : nontender [Uterine Adnexae] : non-palpable

## 2023-04-06 NOTE — HISTORY OF PRESENT ILLNESS
[N] : Patient does not use contraception [Y] : Positive pregnancy history [Menarche Age: ____] : age at menarche was [unfilled] [No] : Patient does not have concerns regarding sex [Currently Active] : currently active [PapSmeardate] : 02/23/23 [TextBox_31] : WNL [GonorrheaDate] : 02/23/23 [TextBox_63] : NEG [ChlamydiaDate] : 02/23/23 [TextBox_68] : NEG [TrichomonasDate] : 02/24/23 [TextBox_73] : NEG [HPVDate] : 02/23/23 [TextBox_78] : NEG [LMPDate] : 12/01/22 [PGxTotal] : 6 [[facility]xFull[facility]] : 2 [[facility]xLiving] : 2 [PGHxABInduced] : 4 [FreeTextEntry1] : 12/01/22

## 2023-04-06 NOTE — PLAN
[FreeTextEntry1] : -F/u vaginal cultures and serum hcg\par -Will treat as indicated pending results \par -Reviewed good vulvovaginal hygiene, unscented soap and cotton, breathable underwear \par -Call or RTO PRN for any new problems, questions or concerns \par

## 2023-04-07 LAB — HCG SERPL-MCNC: 4 MIU/ML

## 2023-04-08 LAB
CANDIDA VAG CYTO: NOT DETECTED
G VAGINALIS+PREV SP MTYP VAG QL MICRO: DETECTED
T VAGINALIS VAG QL WET PREP: NOT DETECTED

## 2023-04-08 RX ORDER — METRONIDAZOLE 500 MG/1
500 TABLET ORAL TWICE DAILY
Qty: 14 | Refills: 0 | Status: ACTIVE | COMMUNITY
Start: 2023-04-08 | End: 1900-01-01

## 2023-04-13 RX ORDER — METRONIDAZOLE 7.5 MG/G
0.75 GEL VAGINAL
Qty: 1 | Refills: 0 | Status: ACTIVE | COMMUNITY
Start: 2023-04-13 | End: 1900-01-01

## 2023-05-11 NOTE — ED STATDOCS - CHIEF COMPLAINT
"PSYCHIATRY INPATIENT PROGRESS NOTE  SUBSEQUENT HOSPITAL VISIT      5/11/2023 9:53 AM   Patricia Langley   1976   [id number]           DATE OF ADMISSION: 5/6/2023  9:12 AM    SITE: [facility]    CURRENT LEGAL STATUS: Patient does not currently meet PEC criteria due to not currently being an imminent threat to self/others and not being gravely disabled 2/2 mental illness at this time.       HISTORY    Per Initial History from Primary Team:           Patient presents with    Hemorrhoids       Large hemorrhoid that has been noted by pt: "For a while." Bleeding noted.     Abdominal Pain       Generalized, abd pain x1 month on/off with n/v+. 6/10 pain.    Korin Gomez is a 47 y/o F with PMH of anxiety, depression, HIV (untreated), hypothyroidism, insomnia, presents with 3 month history abdominal pain that is worse in the past 4 days.  There is associated nausea, vomiting, rectal pain, intermittent episode of bright red blood after BM.  She denies fever, chills, lightheadedness, headaches, chest pain, diarrhea, dysuria, shortness of breath.    In the ED sodium 138, potassium 2.9, bicarb 18, H/H 12.5/36.5, WBC 4.17, UA positive, urine microscopy with few clumps of WBC, many bacteria CT abdomen/pelvis unremarkable with no acute intra-abdominal pathology.  Starting on ceftriaxone pending urine culture  During medication reconciliation, patient stated, " my identity was stolen and all that medications and not for me".  Admit hospital medicine observation  Interval History from Primary Team on 05/09/2023:  Today having some pelvic/abdominal pain and having pain from her cold sore        Chief Complaint / Reason for Original Psychiatry Consult: Psychiatric Medication Management        Subjective / Interval Psychiatric History Today (05/11/2023) (with Psychiatric ROS below):  Patricia Langley is a 46 y.o. female with a past medical history of HIV infection and hyperthyroidism and a past psychiatric history of Schizoaffective " The patient is a 28y year old Female complaining of headache. Disorder, Bipolar Type, anxiety, depression, PTSD, polysubstance abuse, and insomnia, currently being treated by her inpatient primary treatment team for a principal problem of pyelonephritis.  Psychiatry was originally consulted as noted above. The patient was seen and examined again today.  The chart was reviewed again today.  On examination today, the patient remains alert and oriented to person, place, city, state, month, year, and situation.  She remains CAM-ICU negative for delirium.  She endorses sleeping about 6-7 hours overnight with some awakening due to cold sore pain and nausea.  She endorses improving anxiety with starting the Buspar (likely initially placebo effect).  She endorses a good / improving appetite.  She again denies any current or recent AH, VH, or TH.  She continues to exhibit some bizarre / paranoid delusional thought content (again denies any desire or plan to act on this thought content), and while her thought process remains tangential and loosely associated at times, it remains more redirectable today.  Patient again denies any current/recent passive/active SI/HI.  She also endorses recent crystal meth abuse (again unable / unwilling to give timeline).  Less perseverative on wanting Xanax today.  Patient denies any other adverse effects to her current medication regimen.  Regarding current medical/physical complaints, patient endorses cold sore oral pain and improving nausea.  Patient denies any other medical complaints at this time.  NAD was observed during the examination.  Psychotherapy was again implemented as noted below with a focus on improving mood, psychosis, and anxiety.  See detailed psych ROS below.  See A/P below.          Psychiatric Review Of Systems - Currently, the patient is endorsing and/or denying the following:  (patient's endorsements are BOLDED below; if not BOLDED, then patient denied):     Endorses Symptoms of Depression (improving): diminished mood, low  motivation, loss of interest/anhedonia, irritability, diminished energy, change in sleep, change in appetite, diminished concentration or cognition or indecisiveness, PMA/R, excessive guilt or hopelessness or worthlessness, suicidal ideations     Denies issues with Sleep: initiation, maintenance, early morning awakening with inability to return to sleep     Denies Suicidal/Homicidal ideations: active/passive ideations, organized plans, future intentions     Endorses Symptoms of psychosis (slowly improving): hallucinations, delusions, disorganized thinking, disorganized behavior or abnormal motor behavior, or negative symptoms (diminshed emotional expression, avolition, anhedonia, alogia, asociality)      Denies Symptoms of janine or hypomania: elevated, expansive, or irritable mood with increased energy or activity; with inflated self-esteem or grandiosity, decreased need for sleep, increased rate of speech, FOI or racing thoughts, distractibility, increased goal directed activity or PMA, risky/disinhibited behavior     Endorses Symptoms of Anxiety (improving): excessive anxiety/worry/fear, more days than not, about numerous issues, difficult to control, with restlessness, fatigue, poor concentration, irritability, muscle tension, sleep disturbance; causes functionally impairing distress      Denies Symptoms of Panic Disorder: recurrent panic attacks, precipitated or un-precipitated, source of worry and/or behavioral changes secondary; with or without agoraphobia     Endorses Symptoms of PTSD (improving): h/o trauma; re-experiencing/intrusive symptoms, avoidant behavior, negative alterations in cognition or mood, or hyperarousal symptoms; with or without dissociative symptoms      Denies Symptoms of OCD: obsessions or compulsions      Denies Symptoms of Eating Disorders: anorexia, bulimia or binging     Denies Substance Use: intoxication, withdrawal, tolerance, used in larger amounts or duration than intended,  "unsuccessful attempts to limit or quit, increased time engaging in or seeking out, cravings or strong desire to use, failure to fulfill obligations, negative consequences in social/interpersonal/occupational,/recreational areas, use in dangerous situations, medical or psychological consequences         [facility] Toolkit ASQ Suicide Screening Tool:  In the past few weeks, have you wished you were dead? Denies   In the past few weeks, have you felt that you or your family would be better off if you were dead? Denies  In the past week, have you been having thoughts about killing yourself? Denies  Have you ever tried to kill yourself? YES ; remote ("cut myself when I was 11")  Are you having thoughts of killing yourself right now? Denies         PSYCHOTHERAPY ADD-ON [id number]   30 (16-37*) minutes     Time: 22 minutes  Participants: Met with patient     Therapeutic Intervention Type: behavior modifying psychotherapy, supportive psychotherapy  Why chosen therapy is appropriate versus another modality: relevant to diagnosis, patient responds to this modality, evidence based practice     Target symptoms: mood, psychosis, and anxiety  Primary focus: improving mood, psychosis, and anxiety  Psychotherapeutic techniques: supportive and psychodynamic techniques; psycho-education; deep breathing exercises; reality / insight orientation; CBT; problem solving techniques and managing life stressors     Outcome monitoring methods: self-report, observation     Patient's response to intervention:  The patient's response to intervention is accepting / guarded / slowly improving.     Progress toward goals:  The patient's progress toward goals is fair / limited / slowly improving.          ROS:  General ROS: negative for - chills, fatigue, fever or night sweats  Ophthalmic ROS: negative for - blurry vision, double vision or eye pain  ENT ROS: negative for - sinus pain, headache, sore throat or visual changes; positive for cold sore oral pain "   Allergy and Immunology ROS: negative for - hives, itchy/watery eyes or nasal congestion  Hematological and Lymphatic ROS: negative for - bleeding problems, bruising, jaundice or pallor  Endocrine ROS: negative for - galactorrhea, hot flashes, mood swings, palpitations or temperature intolerance  Respiratory ROS: negative for - cough, hemoptysis, shortness of breath, tachypnea or wheezing  Cardiovascular ROS: negative for - chest pain, dyspnea on exertion, loss of consciousness, palpitations, rapid heart rate or shortness of breath  Gastrointestinal ROS: negative for - blood in stools, abdominal pain, change in bowel habits, constipation or diarrhea; positive for improving nausea   Genito-Urinary ROS: negative for - incontinence, nocturia or pelvic pain  Musculoskeletal ROS: negative for - joint stiffness, joint swelling, joint pain or muscle pain   Neurological ROS: negative for - behavioral changes, confusion, dizziness, memory loss, numbness/tingling or seizures  Dermatological ROS: negative for dry skin, hair changes, pruritus or rash  Psychiatric ROS: see detailed psychiatric ROS above in subjective section       PAST MEDICAL & SURGICAL HISTORY   Past Medical History:   Diagnosis Date    Allergy     Anxiety     Cervical dysplasia 1998 / 2016    Depression     HIV infection     Hyperthyroidism     Insomnia      Past Surgical History:   Procedure Laterality Date    SKULL FRACTURE ELEVATION       NEUROLOGIC HISTORY  Seizures: denies   Head trauma: yes, remote      FAMILY HISTORY   Family History   Problem Relation Age of Onset    Heart disease Mother     Breast cancer Neg Hx     Colon cancer Neg Hx     Ovarian cancer Neg Hx        ALLERGIES   Review of patient's allergies indicates:   Allergen Reactions    Penicillins Other (See Comments) and Nausea And Vomiting     Trouble swallowing and vomiting.     Sulfa (sulfonamide antibiotics) Swelling     Rash (skin)^    Opioids - morphine analogues      Pt states she is  not allergic to morphine      Bactrim [sulfamethoxazole-trimethoprim] Rash       CURRENT MEDICATION REGIMEN   Home Meds:   Prior to Admission medications    Medication Sig Start Date End Date Taking? Authorizing Provider   atovaquone (MEPRON) 750 mg/5 mL Susp TAKE 10 MLS (1,500 MG TOTAL) BY MOUTH ONCE DAILY.  Patient not taking: Reported on 1/20/2023 7/15/22   Rd Virk MD   BIKTARVY -25 mg (25 kg or greater) Take 1 tablet by mouth once daily.  Patient not taking: Reported on 4/12/2023 6/6/22   Rd Virk MD   busPIRone (BUSPAR) 10 MG tablet Take 1 tablet (10 mg total) by mouth 3 (three) times daily.  Patient not taking: Reported on 5/6/2023 7/29/22 10/11/22  Day Blankenship PA-C   divalproex (DEPAKOTE) 250 MG EC tablet Take 250 mg by mouth 2 (two) times daily. 3/16/23   Historical Provider   haloperidol lactate (HALDOL) 5 mg/mL injection Inject 1 mL into the muscle every 6 (six) hours as needed. 3/6/23   Historical Provider   hydrOXYzine pamoate (VISTARIL) 25 MG Cap Take 25 mg by mouth 3 (three) times daily as needed. 3/6/23   Historical Provider   ketorolac (TORADOL) 10 mg tablet Take one po q 8 hrs prn pain  Patient not taking: Reported on 3/3/2023 2/28/23   Kelly Luong PA-C   lithium (LITHOTAB) 300 mg tablet Take 1 tablet (300 mg total) by mouth every 8 (eight) hours. LITHIUM CARBONATE ER  Patient not taking: Reported on 4/12/2023 7/29/22   Day Blankenship PA-C   mirtazapine (REMERON) 15 MG tablet Take 15 mg by mouth every evening. 11/10/22   Historical Provider   nicotine (NICODERM CQ) 14 mg/24 hr 1 patch. 11/10/22   Historical Provider   ondansetron (ZOFRAN-ODT) 4 MG TbDL Take 1 tablet (4 mg total) by mouth 2 (two) times daily.  Patient not taking: Reported on 4/12/2023 1/20/23   Mraybel Little NP   QUEtiapine (SEROQUEL) 100 MG Tab Take 100 mg by mouth every evening. 3/8/23   Historical Provider   QUEtiapine (SEROQUEL) 200 MG Tab Take 200 mg by mouth every evening. 3/16/23    Historical Provider   QUEtiapine (SEROQUEL) 50 MG tablet Take 50 mg by mouth every evening. 3/6/23   Historical Provider   temazepam (RESTORIL) 7.5 MG Cap Take 7.5 mg by mouth nightly as needed. 3/13/23   Historical Provider       OTC Meds: denies     Scheduled Meds:    acyclovir  400 mg Oral TID    artificial tears  2 drop Both Eyes TID    atovaquone  1,500 mg Oral Daily    kkwoaaqyj-aozhkyid-gvietej ala  1 tablet Oral Daily    busPIRone  7.5 mg Oral BID    cefTRIAXone (ROCEPHIN) IVPB  1 g Intravenous Q24H    mirtazapine  15 mg Oral QHS    nicotine  1 patch Transdermal Daily    potassium bicarbonate  25 mEq Oral Daily    QUEtiapine  150 mg Oral Nightly      PRN Meds: acetaminophen, ibuprofen, LIDOcaine HCl 2%, ondansetron, sodium chloride 0.9%   Psychotherapeutics (From admission, onward)      Start     Stop Route Frequency Ordered    05/10/23 2100  busPIRone tablet 7.5 mg         -- Oral 2 times daily 05/10/23 1651    05/10/23 2100  QUEtiapine tablet 150 mg         -- Oral Nightly 05/10/23 1651 05/06/23 2100  mirtazapine tablet 15 mg         -- Oral Nightly 05/06/23 1244            LABORATORY DATA   Recent Results (from the past 72 hour(s))   VANCOMYCIN, TROUGH    Collection Time: 05/08/23  1:43 PM   Result Value Ref Range    Vancomycin-Trough 9.8 (L) 10.0 - 22.0 ug/mL   Basic Metabolic Panel    Collection Time: 05/09/23  4:29 AM   Result Value Ref Range    Sodium 142 136 - 145 mmol/L    Potassium 3.4 (L) 3.5 - 5.1 mmol/L    Chloride 111 (H) 95 - 110 mmol/L    CO2 25 23 - 29 mmol/L    Glucose 114 (H) 70 - 110 mg/dL    BUN 6 6 - 20 mg/dL    Creatinine 0.7 0.5 - 1.4 mg/dL    Calcium 8.5 (L) 8.7 - 10.5 mg/dL    Anion Gap 6 (L) 8 - 16 mmol/L    eGFR >60 >60 mL/min/1.73 m^2   CBC auto differential    Collection Time: 05/09/23  4:29 AM   Result Value Ref Range    WBC 1.76 (LL) 3.90 - 12.70 K/uL    RBC 3.58 (L) 4.00 - 5.40 M/uL    Hemoglobin 10.0 (L) 12.0 - 16.0 g/dL    Hematocrit 30.3 (L) 37.0 - 48.5 %    MCV 85 82 -  98 fL    MCH 27.9 27.0 - 31.0 pg    MCHC 33.0 32.0 - 36.0 g/dL    RDW 15.1 (H) 11.5 - 14.5 %    Platelets 149 (L) 150 - 450 K/uL    MPV 11.5 9.2 - 12.9 fL    Immature Granulocytes Test Not Performed 0.0 - 0.5 %    Immature Grans (Abs) Test Not Performed 0.00 - 0.04 K/uL    nRBC 0 0 /100 WBC    Gran % 71.0 38.0 - 73.0 %    Lymph % 13.0 (L) 18.0 - 48.0 %    Mono % 4.0 4.0 - 15.0 %    Eosinophil % 1.0 0.0 - 8.0 %    Basophil % 1.0 0.0 - 1.9 %    Bands 10.0 %    Platelet Estimate Appears normal     Poik Slight     Ovalocytes Occasional     [place] Cells Occasional     Differential Method Manual    Basic Metabolic Panel    Collection Time: 05/10/23  4:47 AM   Result Value Ref Range    Sodium 143 136 - 145 mmol/L    Potassium 3.6 3.5 - 5.1 mmol/L    Chloride 107 95 - 110 mmol/L    CO2 27 23 - 29 mmol/L    Glucose 88 70 - 110 mg/dL    BUN 5 (L) 6 - 20 mg/dL    Creatinine 0.7 0.5 - 1.4 mg/dL    Calcium 9.0 8.7 - 10.5 mg/dL    Anion Gap 9 8 - 16 mmol/L    eGFR >60 >60 mL/min/1.73 m^2   CBC auto differential    Collection Time: 05/10/23  4:48 AM   Result Value Ref Range    WBC 1.53 (LL) 3.90 - 12.70 K/uL    RBC 3.84 (L) 4.00 - 5.40 M/uL    Hemoglobin 10.8 (L) 12.0 - 16.0 g/dL    Hematocrit 32.3 (L) 37.0 - 48.5 %    MCV 84 82 - 98 fL    MCH 28.1 27.0 - 31.0 pg    MCHC 33.4 32.0 - 36.0 g/dL    RDW 14.6 (H) 11.5 - 14.5 %    Platelets 175 150 - 450 K/uL    MPV 11.4 9.2 - 12.9 fL    Immature Granulocytes CANCELED 0.0 - 0.5 %    Immature Grans (Abs) CANCELED 0.00 - 0.04 K/uL    Lymph # CANCELED 1.0 - 4.8 K/uL    Mono # CANCELED 0.3 - 1.0 K/uL    Eos # CANCELED 0.0 - 0.5 K/uL    Baso # CANCELED 0.00 - 0.20 K/uL    nRBC 0 0 /100 WBC    Gran % 52.0 38.0 - 73.0 %    Lymph % 30.0 18.0 - 48.0 %    Mono % 4.0 4.0 - 15.0 %    Eosinophil % 8.0 0.0 - 8.0 %    Basophil % 4.0 (H) 0.0 - 1.9 %    Metamyelocytes 2.0 %    Platelet Estimate Appears normal     [place] Slight     Poik Slight     Ovalocytes Occasional     Differential Method Manual   "  Basic Metabolic Panel    Collection Time: 05/11/23  4:52 AM   Result Value Ref Range    Sodium 141 136 - 145 mmol/L    Potassium 3.8 3.5 - 5.1 mmol/L    Chloride 105 95 - 110 mmol/L    CO2 26 23 - 29 mmol/L    Glucose 94 70 - 110 mg/dL    BUN 9 6 - 20 mg/dL    Creatinine 0.8 0.5 - 1.4 mg/dL    Calcium 8.8 8.7 - 10.5 mg/dL    Anion Gap 10 8 - 16 mmol/L    eGFR >60 >60 mL/min/1.73 m^2   CBC auto differential    Collection Time: 05/11/23  4:52 AM   Result Value Ref Range    WBC 1.69 (LL) 3.90 - 12.70 K/uL    RBC 3.67 (L) 4.00 - 5.40 M/uL    Hemoglobin 10.2 (L) 12.0 - 16.0 g/dL    Hematocrit 31.1 (L) 37.0 - 48.5 %    MCV 85 82 - 98 fL    MCH 27.8 27.0 - 31.0 pg    MCHC 32.8 32.0 - 36.0 g/dL    RDW 14.4 11.5 - 14.5 %    Platelets 192 150 - 450 K/uL    MPV 11.3 9.2 - 12.9 fL      No results found for: PHENYTOIN, PHENOBARB, VALPROATE, CBMZ      EXAMINATION    VITALS   Vitals:    05/11/23 0025 05/11/23 0338 05/11/23 0445 05/11/23 0837   BP:  97/61  133/87   Pulse: 80 87 70 76   Resp:  19  20   Temp:  96.9 °F (36.1 °C)  98.8 °F (37.1 °C)   TempSrc:       SpO2:  97%  99%   Weight:       Height:            CONSTITUTIONAL  General Appearance: NAD, unremarkable, age appropriate, normal weight, seated in bed, calm, less guarded      MUSCULOSKELETAL  Muscle Strength and Tone: WNL    Abnormal Involuntary Movements: none observed   Gait and Station: WNL; non-ataxic      PSYCHIATRIC   Behavior/Cooperation:  cooperative, better participation, less restless, more friendly and jovial   Speech:  normal tone, normal rate, normal pitch, normal volume   Language: grossly intact with spontaneous speech, able to name and repeat   Mood: "feeling better ; my mouth is hurting though"  Affect:  congruent with mood and less irritable / guarded   Associations: derailment of thought; has SONAL (slowly improving)  Thought Process: circumferential / tangential (slowly improving)  Thought Content: + delusions ; denies SI, HI, AH, VH, or TH (no RIS " observed)  Sensorium: Alert   Alert and Oriented: to person, place, city, state, month, year, and situation   Memory: 3/3 immediate, 3/3 at 5 minutes               Recent:  Intact; able to report recent events              Remote:  Intact; Named 4/4 past presidents   Attention/concentration: Intact / Fair. Appropriate for age/education. Able to spell w-[id number] & d-[id number]-[id number]-w.   Similarities: Intact (difference between apple and orange?)  Abstract reasoning: Intact  Fund of Knowledge: Named 4/4 past presidents   Insight: Limited   Judgment: Limited      CAM ICU Delirium Assessment - NEGATIVE     Is the patient aware of the biomedical complications associated with substance abuse and mental illness? yes           MEDICAL DECISION MAKING     ASSESSMENT         Schizoaffective Disorder, Bipolar Type   PTSD  Unspecified Anxiety Disorder   Hx of Polysubstance Abuse         RECOMMENDATIONS       - At this time, the patient does not currently meet PEC criteria due to not being an imminent threat to self/others and not being gravely disabled 2/2 mental illness; will continue to evaluate the need for PEC during the hospitalization.       - Continue Seroquel at 150 mg PO QHS for sleep / appetite / mood / psychosis (discussed risks/benefits/alt vs no treatment with patient).     - Continue Remeron 15 mg PO QHS for mood / anxiety / sleep / appetite (discussed risks/benefits/alt vs no treatment with patient).     - Continue Buspar 7.5 mg PO BID for anxiety / mood (discussed risks/benefits/alt vs no treatment with patient).     - Psychotherapy was again performed with patient as noted above with a focus on improving mood, psychosis, anxiety, and total sobriety.       - Patient's most recent resulted labs, imaging, and EKG were reviewed today.  Repeat EKG to assess for improvement in QTc.     - Please have CM/SW assist patient with ASAP outpatient mental health f/u for s/p discharge from this facility.      - Patient was again  instructed to call 911 and/or [phone], and return to the nearest ED if she begins feeling suicidal, homicidal, or gravely disabled (for s/p this hospitalization).       - Thank you for this consult ; will continue to follow patient while at [facility].         Total time spent with patient and/or managing/coordinating patient's care today (excluding the time spent on psychotherapy): 56 minutes   Time spent on psychotherapy today (as noted above): 22 minutes   Total time for encounter today including psychotherapy: 78 minutes      More than 50% of the time was spent counseling/coordinating care.     Consulting clinician was informed of the encounter and consult note.      STAFF:  Say Coley MD  [facility]  5/11/2023